# Patient Record
Sex: FEMALE | Race: WHITE | NOT HISPANIC OR LATINO | Employment: OTHER | URBAN - METROPOLITAN AREA
[De-identification: names, ages, dates, MRNs, and addresses within clinical notes are randomized per-mention and may not be internally consistent; named-entity substitution may affect disease eponyms.]

---

## 2023-10-23 ENCOUNTER — TELEPHONE (OUTPATIENT)
Dept: PRIMARY CARE | Facility: CLINIC | Age: 68
End: 2023-10-23
Payer: MEDICARE

## 2023-10-23 DIAGNOSIS — G89.29 CHRONIC KNEE PAIN, UNSPECIFIED LATERALITY: Primary | ICD-10-CM

## 2023-10-23 DIAGNOSIS — M25.569 CHRONIC KNEE PAIN, UNSPECIFIED LATERALITY: Primary | ICD-10-CM

## 2023-10-23 NOTE — TELEPHONE ENCOUNTER
Pt of Dr Ojeda's. She injured L knee months ago doing yard work and was nursing it herself at home. She is now feeling the pain and would like to get an appt to have a possible MRI to advise the injury. I do not show any possible appts with you. Please advise where you would like the pt fit in or if you would like her to advise another option. Thank you!

## 2023-10-25 ENCOUNTER — CLINICAL SUPPORT (OUTPATIENT)
Dept: ORTHOPEDIC SURGERY | Facility: CLINIC | Age: 68
End: 2023-10-25
Payer: MEDICARE

## 2023-10-25 ENCOUNTER — OFFICE VISIT (OUTPATIENT)
Dept: ORTHOPEDIC SURGERY | Facility: CLINIC | Age: 68
End: 2023-10-25
Payer: MEDICARE

## 2023-10-25 DIAGNOSIS — M23.307 MENISCUS DEGENERATION, LEFT: Primary | ICD-10-CM

## 2023-10-25 DIAGNOSIS — G89.29 CHRONIC KNEE PAIN, UNSPECIFIED LATERALITY: ICD-10-CM

## 2023-10-25 DIAGNOSIS — M17.12 ARTHRITIS OF KNEE, LEFT: ICD-10-CM

## 2023-10-25 DIAGNOSIS — M25.562 LEFT KNEE PAIN, UNSPECIFIED CHRONICITY: ICD-10-CM

## 2023-10-25 DIAGNOSIS — M25.569 CHRONIC KNEE PAIN, UNSPECIFIED LATERALITY: ICD-10-CM

## 2023-10-25 PROCEDURE — 99214 OFFICE O/P EST MOD 30 MIN: CPT | Performed by: FAMILY MEDICINE

## 2023-10-25 PROCEDURE — 1159F MED LIST DOCD IN RCRD: CPT | Performed by: FAMILY MEDICINE

## 2023-10-25 PROCEDURE — 1036F TOBACCO NON-USER: CPT | Performed by: FAMILY MEDICINE

## 2023-10-25 PROCEDURE — 1126F AMNT PAIN NOTED NONE PRSNT: CPT | Performed by: FAMILY MEDICINE

## 2023-10-25 RX ORDER — RIVAROXABAN 20 MG/1
1 TABLET, FILM COATED ORAL NIGHTLY
COMMUNITY
Start: 2022-09-16 | End: 2024-05-20

## 2023-10-25 RX ORDER — LEVOTHYROXINE SODIUM 88 UG/1
88 TABLET ORAL
COMMUNITY
Start: 2023-07-31

## 2023-10-25 RX ORDER — LIOTHYRONINE SODIUM 5 UG/1
5 TABLET ORAL DAILY
COMMUNITY
Start: 2016-06-16 | End: 2023-12-29 | Stop reason: ALTCHOICE

## 2023-10-25 RX ORDER — LISINOPRIL 10 MG/1
0.5 TABLET ORAL NIGHTLY
COMMUNITY
Start: 2021-10-14 | End: 2023-12-29 | Stop reason: ALTCHOICE

## 2023-10-25 RX ORDER — LEVOTHYROXINE SODIUM 88 UG/1
1 TABLET ORAL
COMMUNITY
End: 2023-11-15

## 2023-10-25 ASSESSMENT — PAIN SCALES - GENERAL: PAINLEVEL_OUTOF10: 6

## 2023-10-25 ASSESSMENT — PAIN DESCRIPTION - DESCRIPTORS: DESCRIPTORS: SHARP;SHOOTING;OTHER (COMMENT)

## 2023-10-25 ASSESSMENT — PAIN - FUNCTIONAL ASSESSMENT: PAIN_FUNCTIONAL_ASSESSMENT: 0-10

## 2023-10-25 NOTE — PROGRESS NOTES
Chief Complaint: Pain of the Left Knee  History of Present Illness:  Encounter date: 10/25/2023  Sherice Dickerson is a 68 y.o. female who presents today for evaluation of left knee pain.  She has had bilateral knee pain for years but it would resolved after short periods of rest.  She had a incident last fall where she was rototilling and had some twisting her knee.  Since then she has had continuous pain in her left knee with progressively worsening pain over the summer.  She localizes the pain to the medial joint line.  Pain is worse with walking, biking, and exercising.  She has tried acupuncture, massage, heat, ice, and Tylenol for pain relief with no significant improvement.  She denies numbness, tingling, weakness, locking.    Problem List  There are no problems to display for this patient.      Past Medical History  Past Medical History:   Diagnosis Date    Elevated blood-pressure reading, without diagnosis of hypertension 07/06/2020    Elevated blood pressure reading    Nontoxic single thyroid nodule 12/27/2018    Right thyroid nodule       Past Surgical History  Past Surgical History:   Procedure Laterality Date    COLONOSCOPY  09/21/2016    Complete Colonoscopy    TONSILLECTOMY  06/05/2014    Tonsillectomy    TOTAL THYROIDECTOMY  06/12/2018    Thyroid Surgery Total Thyroidectomy       Social History  Social History     Socioeconomic History    Marital status:      Spouse name: Not on file    Number of children: Not on file    Years of education: Not on file    Highest education level: Not on file   Occupational History    Not on file   Tobacco Use    Smoking status: Never    Smokeless tobacco: Never   Substance and Sexual Activity    Alcohol use: Not on file    Drug use: Not on file    Sexual activity: Not on file   Other Topics Concern    Not on file   Social History Narrative    Not on file     Social Determinants of Health     Financial Resource Strain: Not on file   Food Insecurity: Not on file    Transportation Needs: Not on file   Physical Activity: Not on file   Stress: Not on file   Social Connections: Not on file   Intimate Partner Violence: Not on file   Housing Stability: Not on file       Allergies  No Known Allergies    Medications  Current Outpatient Medications   Medication Sig Dispense Refill    levothyroxine (Synthroid, Levoxyl) 88 mcg tablet Take 1 tablet (88 mcg) by mouth once daily in the morning. Take before meals.      liothyronine (Cytomel) 5 mcg tablet Take 1 tablet (5 mcg) by mouth once daily.      lisinopril 10 mg tablet Take 0.5 tablets (5 mg) by mouth once daily at bedtime.      Xarelto 20 mg tablet Take 1 tablet (20 mg) by mouth once daily at bedtime.      atorvastatin 20 mg/5 mL (4 mg/mL) suspension Take 1 capsule by mouth 3 times a day with meals.      levothyroxine (Synthroid) 88 mcg tablet Take 1 mcg/kg by mouth once daily in the morning. Take before meals.       No current facility-administered medications for this visit.       Physical Exam:  General exam-  GENERAL:  Awake, alert, and oriented, no apparent distress, pleasant, and cooperative  PSYC: Mood is euthymic, affect is congruent  EAR, NOSE, THROAT:  Normocephalic, atraumatic, moist membranes, anicteric sclera  LUNG: Nonlabored breathing  HEART: No clubbing or cyanosis  SKIN: No increased erythema, warmth, rashes, or concerning skin lesions  NEURO: Sensation is intact in the bilateral upper and lower extremities. Strength is grossly 5 out of 5 throughout the bilateral upper and lower extremities, unless noted below.  GAIT: Non-antalgic  MUSCULOSKELETAL:  Examination of the bilateral knee: Range of motion is full but painful at end range flexion bilaterally. No effusion. No patellar apprehension.  There is tenderness palpation over the medial joint line bilaterally left greater than right.  There is no tenderness over the lateral joint line, patellar facets. Varus and valgus stress test are negative. Lachman's negative.  Posterior drawer is negative. Mary's and meniscal grind tests are positive.    Imaging:  Left knee x-ray shows moderate arthritis of the medial compartment and mild arthritis of patellofemoral joint.    The studies were reviewed with Dr. Lozoya personally in the office today.    Diagnoses and all orders for this visit:  Left knee pain, unspecified chronicity  -     XR knee left 3 views; Future  -     Point of Care Ultrasound  Chronic knee pain, unspecified laterality  -     Referral to Orthopaedic Surgery    Patient Discussion/Summary  We reviewed the exam and x-ray findings and discussed the conservative and surgical treatment options. We agreed to start with conservative treatment including formal physical therapy and diclofenac gel. Briefly discussed further treatment if she failed conservative treatment including injections to replacement. Patient instructed to follow-up if symptoms do not improve after physical therapy or significantly worsened.      Navi Tang DO  Primary Care Sports Medicine Fellow    A report of my findings and recommendations will be sent to the primary and referring physician via written or electronic means when information is available.

## 2023-11-09 ENCOUNTER — EVALUATION (OUTPATIENT)
Dept: PHYSICAL THERAPY | Facility: CLINIC | Age: 68
End: 2023-11-09
Payer: MEDICARE

## 2023-11-09 DIAGNOSIS — M23.307 MENISCUS DEGENERATION, LEFT: ICD-10-CM

## 2023-11-09 DIAGNOSIS — M25.562 CHRONIC PAIN OF BOTH KNEES: Primary | ICD-10-CM

## 2023-11-09 DIAGNOSIS — M17.12 ARTHRITIS OF KNEE, LEFT: ICD-10-CM

## 2023-11-09 DIAGNOSIS — G89.29 CHRONIC PAIN OF BOTH KNEES: Primary | ICD-10-CM

## 2023-11-09 DIAGNOSIS — M25.561 CHRONIC PAIN OF BOTH KNEES: Primary | ICD-10-CM

## 2023-11-09 PROCEDURE — 97535 SELF CARE MNGMENT TRAINING: CPT | Mod: GP

## 2023-11-09 PROCEDURE — 97110 THERAPEUTIC EXERCISES: CPT | Mod: GP

## 2023-11-09 PROCEDURE — 97161 PT EVAL LOW COMPLEX 20 MIN: CPT | Mod: GP

## 2023-11-09 ASSESSMENT — PATIENT HEALTH QUESTIONNAIRE - PHQ9
SUM OF ALL RESPONSES TO PHQ9 QUESTIONS 1 AND 2: 0
1. LITTLE INTEREST OR PLEASURE IN DOING THINGS: NOT AT ALL
2. FEELING DOWN, DEPRESSED OR HOPELESS: NOT AT ALL

## 2023-11-09 ASSESSMENT — ENCOUNTER SYMPTOMS
LOSS OF SENSATION IN FEET: 0
OCCASIONAL FEELINGS OF UNSTEADINESS: 0
DEPRESSION: 0

## 2023-11-09 NOTE — PROGRESS NOTES
Physical Therapy    Physical Therapy Evaluation and Treatment      Patient Name: Sherice Dickerson  MRN: [id number]  Today's Date: 11/9/2023  Time Calculation  Start Time: 1606  Stop Time: 1646  Time Calculation (min): 40 min      Insurance  Visit number:  1  MC 90 day 11/9/23 - 2/7/23      Assessment:  Pt. presented with B knee pain which was likely d/t OA + medial meniscal degeneration resulting from hip weakness, altered body/joint mechanics, & abnormal neuromotor control.  Pt. is likely to benefit from skilled interventions to address their impairments, & treatment to emphasize dry needling to decrease pain & improve function, manual techniques to decrease pain & improve joint/soft-tissue mobility, balance & gait training exercises to improve safety & functional capacity, & core & LE exercises to increase strength, endurance, & neuromotor control.    Standardized testing and measures administered today reveal that the patient has multiple impairments in body structures and functions, activity limitations, and participation restrictions.  The patient does not have any personal factors and comorbidities that may serve as barriers affecting the plan of care.   Skilled PT services are warranted in order to realize measurable change in the above outcome measures and achieve improvements in the patient's functional status and individual goals.      Plan:  Treatment/Interventions: Blood flow restriction therapy, Dry needling, Education/ Instruction, Electrical stimulation, Gait training, Manual therapy, Neuromuscular re-education, Self care/ home management, Therapeutic activities, Therapeutic exercises  PT Plan: Skilled PT  PT Frequency: 1 time per week  Duration: 8 weeks  Onset Date: 11/09/22  Certification Period Start Date: 11/09/23  Certification Period End Date: 02/07/24  Rehab Potential: Good  Plan of Care Agreement: Patient    Current Problem:   1. Chronic pain of both knees  Follow Up In Physical Therapy      2.  Arthritis of knee, left  Referral to Physical Therapy    Follow Up In Physical Therapy      3. Meniscus degeneration, left  Referral to Physical Therapy    Follow Up In Physical Therapy          Subjective    Sherice Dickerson is a 68 y.o. female who presents with c/o B knee pain.  Patient states symptoms began insidiously ~1 year ago.  Pt states the pain got progressively worse over the next few months.  She attempted to go to the rec center to strengthen her knees but that just made it worse.  She attempted acupuncture which helped a little, but didn't provide relief.  She has also attempted massage which helps short term.  Pt denies any locking/catching, but the knees do 'click' a bit.    Pain Intensity:  0/10 currently at rest, (10 = worst imaginable pain), 7-8/10 at worst.    Description:  achy at times, but can be sharp  Duration:  constant   Status:  progressively worsening     Reports symptoms are aggravated with walking, prolonged standing, turning/pivoting, stairs, and alleviated with rest, avoiding aggravating activities .     Objective   Observations:  pt was pleasant, cooperative, & A+O x3.  Visual inspection was unremarkable.  Distal PMS was intact & S/S of infection noted.    Palpatory Exam:  pt. c/o their 'familiar' pain B medial knee joint line.  Hypertonicity & tenderness noted in the B hip flexors, quadriceps, TFL, gluteals, hip ERs, hamstring, & gastroc/soleus mm.    Hip ROM_______ ___R [deg]___ ___L [deg]___    Flex____________ ___105_____ ___105_____   ER in 90/90____ ___55_____ ___55_____   IR in 90/90_____ ___15_____ ___40_____   ER in Prone____ ___60_____ ___60_____   IR in Prone_____ ___35_____ ___45_____   Ext_____________ ___-5_____ ___0_____   * = painful       Knee ROM_____ ___R [deg]___ ___L [deg]___    Flex____________ ___135_____ ___125*_____   Ext_____________ ___0_____ ___0_____   * = painful    Hip  MMT______ ___R______ ___L_____   Flex____________ ___5/5____ ___5/5___   ADD___________ ___5/5____ ___5/5___   ABD___________  ___3/5____ ___3-/5___   Ext_____________ ___3/5____ ___3-/5___   * = painful          Knee MMT_____ ___R______ ___L_____   Flex____________ ___5/5____ ___5/5___   Ext_____________ ___5/5*____ ___5/5*___   * = painful         Hip Tests_________ ___R_____ ___L_____    Gorge's____________ ___[+]_____ ___[+]_____   Thomas__________ ___[+]_____ ___[+]_____     Knee Tests_________ ___R_____ ___L_____    Valgus at 0 deg____ ___[-]_____ ___[-]_____   Valgus at 30 deg___ ___[-]_____ ___[-]_____   Varus at 0 deg_____ ___[-]_____ ___[-]_____   Varus at 30 deg____ ___[-]_____ ___[-]_____   Jt. Line Tenderness_ ___[+]_____ ___[+]_____   Mary's________ ___[+]_____ ___[+]_____   Apley's___________ ___[-]_____ ___[+]_____         Treatment today included:    PT Evaluation (65960):  16 minutes      Therapeutic Exercises (05269):  18 minutes    Access Code: [email or web address]  URL: [email or web address]  Date: 11/09/2023  Prepared by: Joshua Moeller    Exercises  - Supine Bridge  - 5-7 x weekly - 2-3 sets - 10-15 reps  - Sidelying Hip Abduction  - 5-7 x weekly - 2-3 sets - 10-15 reps  - Squat with Chair Touch  - 5-7 x weekly - 2-3 sets - 10-15 reps    * = added to HEP.  Sheet with exercise descriptions and images issued.  Skilled intervention utilized in the appropriate selection & application of above exercises.  Verbal and tactile cues provided for proper form and technique.  Pt. demonstrated appropriate form & verbalized understanding of optimal technique for above exercises.      TA = transverse abdominis activation  PPT = posterior pelvic tilt      Self Care / Home Management (90672):  12 minutes  The patient was educated on:  the importance of positioning, proper posture, and body mechanics, joint mechanics and pathology, general tissue healing time, the appropriate use of heat and cold to  control pain and inflammation, the importance of general therapeutic exercise, especially to stay within pain-free ROM, specific anatomy, function, & regional interdependence of involved areas, & likely cause of impairments & POC.  Pt's questions were answered to their satisfaction, & pt. verbalized understanding & agreement with POC.    Outcome Measures:  Other Measures  Lower Extremity Funtional Score (LEFS): 58/80 or 72.5%       Goals:  Demonstrate independence with home exercise program  Increase B knee ROM to pain free + WNL  Increase B knee strength to pain free + WNL  Report decrease in pain by greater than or equal to 2 points to meet the MCID  Increase score of LEFS by greater than or equal to 9 points to meet the MCID  Perform ADLs without an increase symptoms  Ascend / descend stairs without an increase in symptoms  Ambulate x 2 miles without an increase in symptoms  Pt. will be able to sleep through the night without an increase in symptoms

## 2023-11-09 NOTE — Clinical Note
November 9, 2023     Patient: Sherice Dickerson   YOB: 1955   Date of Visit: 11/9/2023       To Whom It May Concern:    It is my medical opinion that Sherice Dickerson {Work release (duty restriction):33830}.    If you have any questions or concerns, please don't hesitate to call.         Sincerely,        Joshua Moeller, PT    CC: No Recipients

## 2023-11-09 NOTE — LETTER
November 9, 2023    [place]  [address] [address]  [address]  [facility] [address]    Patient: Sherice Dickerson   YOB: 1955   Date of Visit: 11/9/2023       Dear Navdeep Lozoya Md  [address]  [place] [address]    The attached plan of care is being sent to you because your patient’s medical reimbursement requires that you certify the plan of care. Your signature is required to allow uninterrupted insurance coverage.      You may indicate your approval by signing below and faxing this form back to us at Dept Fax: [phone].    Please call Dept: [phone] with any questions or concerns.    Thank you for this referral,        Joshua Moeller PT  [facility] [address] [facility]  [address] [facility] [address]    Payer: Payor: MEDICARE / Plan: MEDICARE PART A AND B / Product Type: *No Product type* /                                                                         Date:     Dear Joshua Moeller PT,     Re: Ms. Sherice Dickerson, MRN:[id number]    I certify that I have reviewed the attached plan of care and it is medically necessary for Ms. Sherice Dickerson (1955) who is under my care.          ______________________________________                    _________________  Provider name and credentials                                           Date and time                                                                                           Plan of Care 11/9/23   Effective from: 11/9/2023  Effective to: 2/7/2024    Plan ID: [id number]            Participants as of Finalize on 11/9/2023    Name Type Comments Contact Info    Navdeep Lozoya MD Referring Provider  [phone]    Joshua Moeller PT Physical Therapist  [phone]       Last Plan Note     Author: Joshua Moeller PT Status: Incomplete Last edited: 11/9/2023  4:00 PM       Physical Therapy    Physical Therapy Evaluation and Treatment      Patient Name: Sherice Dickerson  MRN:  [id number]  Today's Date: 11/9/2023  Time Calculation  Start Time: 1606  Stop Time: 1646  Time Calculation (min): 40 min      Insurance  Visit number:  1  MC 90 day 11/9/23 - 2/7/23      Assessment:  Pt. presented with B knee pain which was likely d/t OA + medial meniscal degeneration resulting from hip weakness, altered body/joint mechanics, & abnormal neuromotor control.  Pt. is likely to benefit from skilled interventions to address their impairments, & treatment to emphasize dry needling to decrease pain & improve function, manual techniques to decrease pain & improve joint/soft-tissue mobility, balance & gait training exercises to improve safety & functional capacity, & core & LE exercises to increase strength, endurance, & neuromotor control.    Standardized testing and measures administered today reveal that the patient has multiple impairments in body structures and functions, activity limitations, and participation restrictions.  The patient does not have any personal factors and comorbidities that may serve as barriers affecting the plan of care.   Skilled PT services are warranted in order to realize measurable change in the above outcome measures and achieve improvements in the patient's functional status and individual goals.      Plan:  Treatment/Interventions: Blood flow restriction therapy, Dry needling, Education/ Instruction, Electrical stimulation, Gait training, Manual therapy, Neuromuscular re-education, Self care/ home management, Therapeutic activities, Therapeutic exercises  PT Plan: Skilled PT  PT Frequency: 1 time per week  Duration: 8 weeks  Onset Date: 11/09/22  Certification Period Start Date: 11/09/23  Certification Period End Date: 02/07/24  Rehab Potential: Good  Plan of Care Agreement: Patient    Current Problem:   1. Chronic pain of both knees  Follow Up In Physical Therapy      2. Arthritis of knee, left  Referral to Physical Therapy    Follow Up In Physical Therapy      3. Meniscus  degeneration, left  Referral to Physical Therapy    Follow Up In Physical Therapy          Subjective   Sherice Dickerson is a 68 y.o. female who presents with c/o B knee pain.  Patient states symptoms began insidiously ~1 year ago.  Pt states the pain got progressively worse over the next few months.  She attempted to go to the rec center to strengthen her knees but that just made it worse.  She attempted acupuncture which helped a little, but didn't provide relief.  She has also attempted massage which helps short term.  Pt denies any locking/catching, but the knees do 'click' a bit.    Pain Intensity:  0/10 currently at rest, (10 = worst imaginable pain), 7-8/10 at worst.    Description:  achy at times, but can be sharp  Duration:  constant   Status:  progressively worsening     Reports symptoms are aggravated with walking, prolonged standing, turning/pivoting, stairs, and alleviated with rest, avoiding aggravating activities .     Objective  Observations:  pt was pleasant, cooperative, & A+O x3.  Visual inspection was unremarkable.  Distal PMS was intact & S/S of infection noted.    Palpatory Exam:  pt. c/o their 'familiar' pain B medial knee joint line.  Hypertonicity & tenderness noted in the B hip flexors, quadriceps, TFL, gluteals, hip ERs, hamstring, & gastroc/soleus mm.    Hip ROM_______ ___R [deg]___ ___L [deg]___    Flex____________ ___105_____ ___105_____   ER in 90/90____ ___55_____ ___55_____   IR in 90/90_____ ___15_____ ___40_____   ER in Prone____ ___60_____ ___60_____   IR in Prone_____ ___35_____ ___45_____   Ext_____________ ___-5_____ ___0_____   * = painful       Knee ROM_____ ___R [deg]___ ___L [deg]___    Flex____________ ___135_____ ___125*_____   Ext_____________ ___0_____ ___0_____   * = painful    Hip  MMT______ ___R______ ___L_____   Flex____________ ___5/5____ ___5/5___   ADD___________ ___5/5____ ___5/5___   ABD___________  ___3/5____ ___3-/5___   Ext_____________ ___3/5____ ___3-/5___   * = painful          Knee MMT_____ ___R______ ___L_____   Flex____________ ___5/5____ ___5/5___   Ext_____________ ___5/5*____ ___5/5*___   * = painful         Hip Tests_________ ___R_____ ___L_____    Gorge's____________ ___[+]_____ ___[+]_____   Thomas__________ ___[+]_____ ___[+]_____     Knee Tests_________ ___R_____ ___L_____    Valgus at 0 deg____ ___[-]_____ ___[-]_____   Valgus at 30 deg___ ___[-]_____ ___[-]_____   Varus at 0 deg_____ ___[-]_____ ___[-]_____   Varus at 30 deg____ ___[-]_____ ___[-]_____   Jt. Line Tenderness_ ___[+]_____ ___[+]_____   Mary's________ ___[+]_____ ___[+]_____   Apley's___________ ___[-]_____ ___[+]_____         Treatment today included:    PT Evaluation (89979):  16 minutes      Therapeutic Exercises (11564):  18 minutes    Access Code: [email or web address]  URL: [email or web address]  Date: 11/09/2023  Prepared by: Joshua Moeller    Exercises  - Supine Bridge  - 5-7 x weekly - 2-3 sets - 10-15 reps  - Sidelying Hip Abduction  - 5-7 x weekly - 2-3 sets - 10-15 reps  - Squat with Chair Touch  - 5-7 x weekly - 2-3 sets - 10-15 reps    * = added to HEP.  Sheet with exercise descriptions and images issued.  Skilled intervention utilized in the appropriate selection & application of above exercises.  Verbal and tactile cues provided for proper form and technique.  Pt. demonstrated appropriate form & verbalized understanding of optimal technique for above exercises.      TA = transverse abdominis activation  PPT = posterior pelvic tilt      Self Care / Home Management (71337):  12 minutes  The patient was educated on:  the importance of positioning, proper posture, and body mechanics, joint mechanics and pathology, general tissue healing time, the appropriate use of heat and cold to  control pain and inflammation, the importance of general therapeutic exercise, especially to stay within pain-free ROM, specific anatomy, function, & regional interdependence of involved areas, & likely cause of impairments & POC.  Pt's questions were answered to their satisfaction, & pt. verbalized understanding & agreement with POC.    Outcome Measures:  Other Measures  Lower Extremity Funtional Score (LEFS): 58/80 or 72.5%       Goals:  Demonstrate independence with home exercise program  Increase B knee ROM to pain free + WNL  Increase B knee strength to pain free + WNL  Report decrease in pain by greater than or equal to 2 points to meet the MCID  Increase score of LEFS by greater than or equal to 9 points to meet the MCID  Perform ADLs without an increase symptoms  Ascend / descend stairs without an increase in symptoms  Ambulate x 2 miles without an increase in symptoms  Pt. will be able to sleep through the night without an increase in symptoms           Current Participants as of 11/9/2023    Name Type Comments Contact Info    Navdeep Lozoya MD Referring Provider  [phone]    Signature pending    Joshua Moeller PT Physical Therapist  [phone]    Signature pending

## 2023-11-09 NOTE — Clinical Note
November 9, 2023     Patient: Sherice Dickerson   YOB: 1955   Date of Visit: 11/9/2023       To Whom it May Concern:    Sherice Dickerson was seen in my clinic on 11/9/2023. She {Return to school/sport:89754}.    If you have any questions or concerns, please don't hesitate to call.         Sincerely,          Joshua Moeller, PT        CC: No Recipients

## 2023-11-15 ENCOUNTER — OFFICE VISIT (OUTPATIENT)
Dept: PRIMARY CARE | Facility: CLINIC | Age: 68
End: 2023-11-15
Payer: MEDICARE

## 2023-11-15 VITALS
WEIGHT: 168 LBS | OXYGEN SATURATION: 98 % | DIASTOLIC BLOOD PRESSURE: 82 MMHG | SYSTOLIC BLOOD PRESSURE: 132 MMHG | HEART RATE: 64 BPM | HEIGHT: 68 IN | BODY MASS INDEX: 25.46 KG/M2 | TEMPERATURE: 98.1 F

## 2023-11-15 DIAGNOSIS — I48.91 ATRIAL FIBRILLATION, NEW ONSET (MULTI): ICD-10-CM

## 2023-11-15 DIAGNOSIS — I10 BENIGN ESSENTIAL HYPERTENSION: ICD-10-CM

## 2023-11-15 DIAGNOSIS — Z12.31 VISIT FOR SCREENING MAMMOGRAM: ICD-10-CM

## 2023-11-15 DIAGNOSIS — C73 MALIGNANT NEOPLASM OF THYROID GLAND (MULTI): ICD-10-CM

## 2023-11-15 DIAGNOSIS — E78.00 HYPERCHOLESTEROLEMIA: ICD-10-CM

## 2023-11-15 DIAGNOSIS — D64.9 ANEMIA, UNSPECIFIED TYPE: ICD-10-CM

## 2023-11-15 DIAGNOSIS — Z12.11 COLON CANCER SCREENING: ICD-10-CM

## 2023-11-15 DIAGNOSIS — E55.9 VITAMIN D DEFICIENCY: ICD-10-CM

## 2023-11-15 DIAGNOSIS — E89.0 POSTOPERATIVE HYPOTHYROIDISM: ICD-10-CM

## 2023-11-15 DIAGNOSIS — G89.29 CHRONIC PAIN OF BOTH KNEES: ICD-10-CM

## 2023-11-15 DIAGNOSIS — K86.89 PANCREATIC INSUFFICIENCY (HHS-HCC): ICD-10-CM

## 2023-11-15 DIAGNOSIS — M25.561 CHRONIC PAIN OF BOTH KNEES: ICD-10-CM

## 2023-11-15 DIAGNOSIS — Z00.00 ROUTINE GENERAL MEDICAL EXAMINATION AT HEALTH CARE FACILITY: ICD-10-CM

## 2023-11-15 DIAGNOSIS — Z00.00 MEDICARE ANNUAL WELLNESS VISIT, SUBSEQUENT: Primary | ICD-10-CM

## 2023-11-15 DIAGNOSIS — I77.9 CAROTID ARTERY DISEASE, UNSPECIFIED LATERALITY, UNSPECIFIED TYPE (CMS-HCC): ICD-10-CM

## 2023-11-15 DIAGNOSIS — M25.562 CHRONIC PAIN OF BOTH KNEES: ICD-10-CM

## 2023-11-15 DIAGNOSIS — M35.00 SJOGREN'S SYNDROME, WITH UNSPECIFIED ORGAN INVOLVEMENT (MULTI): ICD-10-CM

## 2023-11-15 PROBLEM — R15.0 INCOMPLETE DEFECATION: Status: ACTIVE | Noted: 2023-11-15

## 2023-11-15 PROBLEM — M72.2 PLANTAR FASCIITIS, RIGHT: Status: ACTIVE | Noted: 2023-11-15

## 2023-11-15 PROBLEM — M65.4 DE QUERVAIN'S TENOSYNOVITIS, LEFT: Status: ACTIVE | Noted: 2023-11-15
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
## 2023-11-15 PROBLEM — R00.2 PALPITATIONS: Status: ACTIVE | Noted: 2023-11-15

## 2023-11-15 PROBLEM — R76.8 SS-A ANTIBODY POSITIVE: Status: ACTIVE | Noted: 2023-11-15

## 2023-11-15 PROBLEM — M25.532 LEFT WRIST PAIN: Status: ACTIVE | Noted: 2023-11-15

## 2023-11-15 PROBLEM — E06.3 HASHIMOTO'S THYROIDITIS: Status: ACTIVE | Noted: 2018-07-18

## 2023-11-15 PROBLEM — R42 DIZZINESS: Status: ACTIVE | Noted: 2023-11-15

## 2023-11-15 PROBLEM — D36.9 TUBULAR ADENOMA: Status: ACTIVE | Noted: 2023-11-15

## 2023-11-15 PROBLEM — E03.9 HYPOTHYROIDISM: Status: ACTIVE | Noted: 2023-11-15

## 2023-11-15 PROBLEM — R76.8 ANA POSITIVE: Status: ACTIVE | Noted: 2023-11-15

## 2023-11-15 PROBLEM — I34.0 MITRAL REGURGITATION: Status: ACTIVE | Noted: 2023-11-15

## 2023-11-15 PROBLEM — N95.8 GENITOURINARY SYNDROME OF MENOPAUSE: Status: ACTIVE | Noted: 2023-11-15

## 2023-11-15 PROBLEM — R63.5 WEIGHT GAIN: Status: ACTIVE | Noted: 2023-11-15

## 2023-11-15 PROBLEM — R10.11 RIGHT UPPER QUADRANT ABDOMINAL PAIN: Status: ACTIVE | Noted: 2023-11-15

## 2023-11-15 PROBLEM — M19.90 ARTHRITIS: Status: ACTIVE | Noted: 2023-11-15

## 2023-11-15 PROBLEM — K58.9 IRRITABLE BOWEL SYNDROME: Status: ACTIVE | Noted: 2022-01-04

## 2023-11-15 PROBLEM — Z77.120 SUSPECTED EXPOSURE TO MOLD: Status: ACTIVE | Noted: 2022-05-03

## 2023-11-15 PROBLEM — J01.90 ACUTE SINUSITIS: Status: ACTIVE | Noted: 2023-11-15

## 2023-11-15 PROBLEM — R01.1 HEART MURMUR: Status: ACTIVE | Noted: 2023-11-15

## 2023-11-15 PROBLEM — I51.7 ENLARGED HEART: Status: ACTIVE | Noted: 2023-11-15

## 2023-11-15 PROBLEM — M79.89 SOFT TISSUE MASS: Status: ACTIVE | Noted: 2023-11-15

## 2023-11-15 PROBLEM — M70.40 PREPATELLAR BURSITIS: Status: ACTIVE | Noted: 2023-11-15

## 2023-11-15 PROBLEM — F41.1 GENERALIZED ANXIETY DISORDER: Status: ACTIVE | Noted: 2023-11-15

## 2023-11-15 PROCEDURE — 3079F DIAST BP 80-89 MM HG: CPT | Performed by: INTERNAL MEDICINE

## 2023-11-15 PROCEDURE — 1126F AMNT PAIN NOTED NONE PRSNT: CPT | Performed by: INTERNAL MEDICINE

## 2023-11-15 PROCEDURE — G0439 PPPS, SUBSEQ VISIT: HCPCS | Performed by: INTERNAL MEDICINE

## 2023-11-15 PROCEDURE — 1036F TOBACCO NON-USER: CPT | Performed by: INTERNAL MEDICINE

## 2023-11-15 PROCEDURE — 1159F MED LIST DOCD IN RCRD: CPT | Performed by: INTERNAL MEDICINE

## 2023-11-15 PROCEDURE — 1160F RVW MEDS BY RX/DR IN RCRD: CPT | Performed by: INTERNAL MEDICINE

## 2023-11-15 PROCEDURE — 99214 OFFICE O/P EST MOD 30 MIN: CPT | Performed by: INTERNAL MEDICINE

## 2023-11-15 PROCEDURE — 3075F SYST BP GE 130 - 139MM HG: CPT | Performed by: INTERNAL MEDICINE

## 2023-11-15 PROCEDURE — G0444 DEPRESSION SCREEN ANNUAL: HCPCS | Performed by: INTERNAL MEDICINE

## 2023-11-15 PROCEDURE — 1170F FXNL STATUS ASSESSED: CPT | Performed by: INTERNAL MEDICINE

## 2023-11-15 PROCEDURE — G0442 ANNUAL ALCOHOL SCREEN 15 MIN: HCPCS | Performed by: INTERNAL MEDICINE

## 2023-11-15 RX ORDER — CHOLECALCIFEROL (VITAMIN D3) 25 MCG
1000 TABLET ORAL DAILY
COMMUNITY
Start: 2022-11-16

## 2023-11-15 RX ORDER — ESTRADIOL 0.1 MG/G
4 CREAM VAGINAL 3 TIMES WEEKLY
COMMUNITY
Start: 2021-12-02

## 2023-11-15 ASSESSMENT — PATIENT HEALTH QUESTIONNAIRE - PHQ9
1. LITTLE INTEREST OR PLEASURE IN DOING THINGS: NOT AT ALL
SUM OF ALL RESPONSES TO PHQ9 QUESTIONS 1 AND 2: 0
2. FEELING DOWN, DEPRESSED OR HOPELESS: NOT AT ALL

## 2023-11-15 ASSESSMENT — ACTIVITIES OF DAILY LIVING (ADL)
DRESSING: INDEPENDENT
MANAGING_FINANCES: INDEPENDENT
DRESSING: INDEPENDENT
TAKING_MEDICATION: INDEPENDENT
BATHING: INDEPENDENT
BATHING: INDEPENDENT
DOING_HOUSEWORK: INDEPENDENT
GROCERY_SHOPPING: INDEPENDENT

## 2023-11-15 ASSESSMENT — ENCOUNTER SYMPTOMS
HEMATOLOGIC/LYMPHATIC NEGATIVE: 1
RESPIRATORY NEGATIVE: 1
PSYCHIATRIC NEGATIVE: 1
CONSTITUTIONAL NEGATIVE: 1
NEUROLOGICAL NEGATIVE: 1
GASTROINTESTINAL NEGATIVE: 1
EYES NEGATIVE: 1

## 2023-11-17 ENCOUNTER — LAB (OUTPATIENT)
Dept: LAB | Facility: LAB | Age: 68
End: 2023-11-17
Payer: MEDICARE

## 2023-11-17 DIAGNOSIS — D64.9 ANEMIA, UNSPECIFIED TYPE: ICD-10-CM

## 2023-11-17 DIAGNOSIS — E55.9 VITAMIN D DEFICIENCY: ICD-10-CM

## 2023-11-17 DIAGNOSIS — E78.00 HYPERCHOLESTEROLEMIA: ICD-10-CM

## 2023-11-17 LAB
25(OH)D3 SERPL-MCNC: 47 NG/ML (ref 30–100)
ALBUMIN SERPL BCP-MCNC: 4.3 G/DL (ref 3.4–5)
ALP SERPL-CCNC: 52 U/L (ref 33–136)
ALT SERPL W P-5'-P-CCNC: 19 U/L (ref 7–45)
ANION GAP SERPL CALC-SCNC: 11 MMOL/L (ref 10–20)
AST SERPL W P-5'-P-CCNC: 19 U/L (ref 9–39)
BASOPHILS # BLD AUTO: 0.03 X10*3/UL (ref 0–0.1)
BASOPHILS NFR BLD AUTO: 0.7 %
BILIRUB SERPL-MCNC: 0.8 MG/DL (ref 0–1.2)
BUN SERPL-MCNC: 13 MG/DL (ref 6–23)
CALCIUM SERPL-MCNC: 9.3 MG/DL (ref 8.6–10.3)
CHLORIDE SERPL-SCNC: 101 MMOL/L (ref 98–107)
CHOLEST SERPL-MCNC: 218 MG/DL (ref 0–199)
CHOLESTEROL/HDL RATIO: 2.7
CO2 SERPL-SCNC: 30 MMOL/L (ref 21–32)
CREAT SERPL-MCNC: 0.92 MG/DL (ref 0.5–1.05)
EOSINOPHIL # BLD AUTO: 0.05 X10*3/UL (ref 0–0.7)
EOSINOPHIL NFR BLD AUTO: 1.2 %
ERYTHROCYTE [DISTWIDTH] IN BLOOD BY AUTOMATED COUNT: 12.6 % (ref 11.5–14.5)
GFR SERPL CREATININE-BSD FRML MDRD: 68 ML/MIN/1.73M*2
GLUCOSE SERPL-MCNC: 89 MG/DL (ref 74–99)
HCT VFR BLD AUTO: 39.8 % (ref 36–46)
HDLC SERPL-MCNC: 81.1 MG/DL
HGB BLD-MCNC: 13.2 G/DL (ref 12–16)
IMM GRANULOCYTES # BLD AUTO: 0 X10*3/UL (ref 0–0.7)
IMM GRANULOCYTES NFR BLD AUTO: 0 % (ref 0–0.9)
LDLC SERPL CALC-MCNC: 121 MG/DL
LYMPHOCYTES # BLD AUTO: 1.72 X10*3/UL (ref 1.2–4.8)
LYMPHOCYTES NFR BLD AUTO: 41.2 %
MCH RBC QN AUTO: 31.1 PG (ref 26–34)
MCHC RBC AUTO-ENTMCNC: 33.2 G/DL (ref 32–36)
MCV RBC AUTO: 94 FL (ref 80–100)
MONOCYTES # BLD AUTO: 0.36 X10*3/UL (ref 0.1–1)
MONOCYTES NFR BLD AUTO: 8.6 %
NEUTROPHILS # BLD AUTO: 2.01 X10*3/UL (ref 1.2–7.7)
NEUTROPHILS NFR BLD AUTO: 48.3 %
NON HDL CHOLESTEROL: 137 MG/DL (ref 0–149)
NRBC BLD-RTO: 0 /100 WBCS (ref 0–0)
PLATELET # BLD AUTO: 255 X10*3/UL (ref 150–450)
POTASSIUM SERPL-SCNC: 3.7 MMOL/L (ref 3.5–5.3)
PROT SERPL-MCNC: 7.6 G/DL (ref 6.4–8.2)
RBC # BLD AUTO: 4.25 X10*6/UL (ref 4–5.2)
SODIUM SERPL-SCNC: 138 MMOL/L (ref 136–145)
TRIGL SERPL-MCNC: 82 MG/DL (ref 0–149)
VLDL: 16 MG/DL (ref 0–40)
WBC # BLD AUTO: 4.2 X10*3/UL (ref 4.4–11.3)

## 2023-11-17 PROCEDURE — 80061 LIPID PANEL: CPT

## 2023-11-17 PROCEDURE — 80053 COMPREHEN METABOLIC PANEL: CPT

## 2023-11-17 PROCEDURE — 82306 VITAMIN D 25 HYDROXY: CPT

## 2023-11-17 PROCEDURE — 36415 COLL VENOUS BLD VENIPUNCTURE: CPT

## 2023-11-17 PROCEDURE — 85025 COMPLETE CBC W/AUTO DIFF WBC: CPT

## 2023-11-20 NOTE — RESULT ENCOUNTER NOTE
Labs show elevated cholesterol and mildly low white blood cell count rest of lab results within normal range.  Please follow low-fat diet and regular exercise.

## 2023-11-30 ENCOUNTER — TREATMENT (OUTPATIENT)
Dept: PHYSICAL THERAPY | Facility: CLINIC | Age: 68
End: 2023-11-30
Payer: MEDICARE

## 2023-11-30 DIAGNOSIS — M25.561 CHRONIC PAIN OF BOTH KNEES: ICD-10-CM

## 2023-11-30 DIAGNOSIS — M23.307 MENISCUS DEGENERATION, LEFT: ICD-10-CM

## 2023-11-30 DIAGNOSIS — M25.562 CHRONIC PAIN OF BOTH KNEES: ICD-10-CM

## 2023-11-30 DIAGNOSIS — G89.29 CHRONIC PAIN OF BOTH KNEES: ICD-10-CM

## 2023-11-30 DIAGNOSIS — M17.12 ARTHRITIS OF KNEE, LEFT: ICD-10-CM

## 2023-11-30 PROCEDURE — 97110 THERAPEUTIC EXERCISES: CPT | Mod: GP

## 2023-11-30 PROCEDURE — 97535 SELF CARE MNGMENT TRAINING: CPT | Mod: GP

## 2023-11-30 NOTE — PROGRESS NOTES
Physical Therapy    Physical Therapy  Treatment      Patient Name: Sherice Dickerson  MRN: [id number]  Today's Date: 11/30/2023  Time Calculation  Start Time: 1430  Stop Time: 1508  Time Calculation (min): 38 min      Insurance  Visit number:  2 (1 of 8)   90 day 11/9/23 - 2/7/23      Assessment:  Pt appears to be making steady progress toward her goals - she reported decreased pain & demo'd improved ROM compared to her evaluation.  Pt tolerated the progressions with the exercise well, but still fatigued quickly particularly on the L LE.  Anticipate pt. will see continued progress with regular HEP adherence, but they would still benefit from regular f/u's to monitor their progress & ensure appropriate progressions.      Plan:  Treatment/Interventions: Blood flow restriction therapy, Dry needling, Education/ Instruction, Electrical stimulation, Gait training, Manual therapy, Neuromuscular re-education, Self care/ home management, Therapeutic activities, Therapeutic exercises  PT Plan: Skilled PT  PT Frequency: 1 time per week  Duration: 8 weeks  Onset Date: 11/09/22  Certification Period Start Date: 11/09/23  Certification Period End Date: 02/07/24  Rehab Potential: Good  Plan of Care Agreement: Patient      Current Problem:   1. Arthritis of knee, left  Follow Up In Physical Therapy      2. Meniscus degeneration, left  Follow Up In Physical Therapy      3. Chronic pain of both knees  Follow Up In Physical Therapy          Subjective    Pt. presented with B knee pain which was likely d/t OA + medial meniscal degeneration resulting from hip weakness, altered body/joint mechanics, & abnormal neuromotor control.       Pt reports she's feeling better & isn't having as much pain.  She still rates the pain as 2-3/10 currently, but still c/o with stairs & squatting.        Objective   Observations:   pt. c/o their 'familiar' pain B medial knee joint line.  Hypertonicity & tenderness noted in the B hip flexors, quadriceps,  "TFL, gluteals, hip ERs, hamstring, & gastroc/soleus mm.    Knee ROM_____ ___R [deg]___ ___L [deg]___    Flex____________ ___137_____ ___136_____   Ext_____________ ___0_____ ___0_____   * = painful      Therapeutic Exercises (91585):  28 minutes  - Supine Bridge   - Supine Bridge + alt marching*  - Sidelying Hip Abduction    - Supine Bridge with red TB*  - Supine B hip ABD/ER with red TB (clamshell)*  - Squat with Chair Touch with red TB*  - Schwinn bike (seat 7) x 8 min  - SL step up + hold on 7\" step (each)*  - SL hip swings (each)*    * = added to HEP.  Sheet with exercise descriptions and images issued.  Red TB issued.  Skilled intervention utilized in the appropriate selection & application of above exercises.  Verbal and tactile cues provided for proper form and technique.  Pt. demonstrated appropriate form & verbalized understanding of optimal technique for above exercises.        Self Care / Home Management ([id number]):  10 minutes    Reviewed & updated current HEP:    Access Code: [email or web address]  URL: [email or web address]  Date: 11/30/2023  Prepared by: Joshua Moeller    Exercises  - Supine Bridge  - 5-7 x weekly - 2-3 sets - 10-15 reps  - Supine Bridge with Resistance Band   - Marching Bridge   - Hooklying Clamshell with Resistance   - Sidelying Hip Abduction  - 5-7 x weekly - 2-3 sets - 10-15 reps  - Squat with Chair Touch  - 5-7 x weekly - 2-3 sets - 10-15 reps  - Squat with Chair Touch and Resistance Loop   - Runner's Step Up/Down  - 5-7 x weekly - 2-3 sets - 10-15 reps  - Hip Swing  - 5-7 x weekly - 2-3 sets - 10-15 reps    Large portion of pt. care time devoted to reviewing pt's current symptoms & determining optimal manual techniques with appropriate HEP modifications.  Discussed importance of regular HEP adherence, & stressed importance of proper form.  Pt. educated about differences between post-exercises soreness vs. increased 'familiar' pain, & reviewed appropriate " progressions/regressions with exercises/activities based on symptoms.  Pt. verbalized understanding & agreement.

## 2023-12-04 ENCOUNTER — TELEPHONE (OUTPATIENT)
Dept: GASTROENTEROLOGY | Facility: CLINIC | Age: 68
End: 2023-12-04
Payer: MEDICARE

## 2023-12-04 NOTE — TELEPHONE ENCOUNTER
Patient has NPV with Tia Hart on 12/11/2023 for colon consult; has order from PCP for colonoscopy; does not need the office appointment.  Please call .  Thank you

## 2023-12-07 ENCOUNTER — APPOINTMENT (OUTPATIENT)
Dept: PHYSICAL THERAPY | Facility: CLINIC | Age: 68
End: 2023-12-07
Payer: MEDICARE

## 2023-12-11 ENCOUNTER — OFFICE VISIT (OUTPATIENT)
Dept: GASTROENTEROLOGY | Facility: CLINIC | Age: 68
End: 2023-12-11
Payer: MEDICARE

## 2023-12-11 ENCOUNTER — TELEPHONE (OUTPATIENT)
Dept: GASTROENTEROLOGY | Facility: CLINIC | Age: 68
End: 2023-12-11

## 2023-12-11 VITALS
HEART RATE: 62 BPM | SYSTOLIC BLOOD PRESSURE: 134 MMHG | HEIGHT: 68 IN | TEMPERATURE: 98.2 F | BODY MASS INDEX: 25.46 KG/M2 | WEIGHT: 168 LBS | DIASTOLIC BLOOD PRESSURE: 84 MMHG

## 2023-12-11 DIAGNOSIS — K58.2 IRRITABLE BOWEL SYNDROME WITH BOTH CONSTIPATION AND DIARRHEA: Primary | ICD-10-CM

## 2023-12-11 DIAGNOSIS — K63.5 POLYP OF COLON, UNSPECIFIED PART OF COLON, UNSPECIFIED TYPE: ICD-10-CM

## 2023-12-11 DIAGNOSIS — Z12.11 COLON CANCER SCREENING: ICD-10-CM

## 2023-12-11 PROCEDURE — 99213 OFFICE O/P EST LOW 20 MIN: CPT | Mod: PO | Performed by: REGISTERED NURSE

## 2023-12-11 PROCEDURE — 3079F DIAST BP 80-89 MM HG: CPT | Performed by: REGISTERED NURSE

## 2023-12-11 PROCEDURE — 3075F SYST BP GE 130 - 139MM HG: CPT | Performed by: REGISTERED NURSE

## 2023-12-11 PROCEDURE — 1160F RVW MEDS BY RX/DR IN RCRD: CPT | Performed by: REGISTERED NURSE

## 2023-12-11 PROCEDURE — 99213 OFFICE O/P EST LOW 20 MIN: CPT | Performed by: REGISTERED NURSE

## 2023-12-11 PROCEDURE — 1159F MED LIST DOCD IN RCRD: CPT | Performed by: REGISTERED NURSE

## 2023-12-11 PROCEDURE — 1036F TOBACCO NON-USER: CPT | Performed by: REGISTERED NURSE

## 2023-12-11 PROCEDURE — 1126F AMNT PAIN NOTED NONE PRSNT: CPT | Performed by: REGISTERED NURSE

## 2023-12-11 RX ORDER — SODIUM, POTASSIUM,MAG SULFATES 17.5-3.13G
1 SOLUTION, RECONSTITUTED, ORAL ORAL EVERY 12 HOURS
Qty: 1 EACH | Refills: 0 | Status: SHIPPED | OUTPATIENT
Start: 2023-12-11 | End: 2024-05-31 | Stop reason: ALTCHOICE

## 2023-12-11 ASSESSMENT — ENCOUNTER SYMPTOMS
JOINT SWELLING: 0
ANAL BLEEDING: 0
NERVOUS/ANXIOUS: 0
EYES NEGATIVE: 1
ENDOCRINE NEGATIVE: 1
DIFFICULTY URINATING: 0
SHORTNESS OF BREATH: 0
DIARRHEA: 0
ABDOMINAL DISTENTION: 0
ABDOMINAL PAIN: 0
UNEXPECTED WEIGHT CHANGE: 0
MYALGIAS: 0
EYE PAIN: 0
ARTHRALGIAS: 0
CONSTIPATION: 0
BLOOD IN STOOL: 0
RECTAL PAIN: 0
APPETITE CHANGE: 0
COUGH: 0
NAUSEA: 0
VOMITING: 0

## 2023-12-11 NOTE — PROGRESS NOTES
REASON FOR VISIT:  Colonoscopy consult     HPI:  Sherice Dickerson is a 68 y.o. female with a history of atrial fibrillation, hypertension, carotid artery disease, mitral regurgitation, hypercholesterolemia and hypothyroidism who presents for colonoscopy consult.  Diagnosed with A- fib within the past year and takes  XARELTO.  Reports that she sometimes goes in and out of A-fib.  She follows with Dr Murry in cardiology. She has been referred for a colonoscopy.  Her last colonoscopy was in January 2019.  History of colon polyps.  Also has a history of irritable bowel syndrome and follows with the [facility] at the [facility].  States that she feels better since she has been going there.  She receives acupuncture periodically for IBS.    Denies any GI concerns at this time.  Moves her bowels usually every day.  Denies urgency, diarrhea, nocturnal bowel movements, constipation, hematochezia or melena.  Denies abdominal pain, nausea, vomiting, reflux or dysphagia.  Her weight and appetite are stable.    Denies a history of NSAID use.  Has never been a smoker.  Does not drink alcohol regularly.    Med list notable for  -Xarelto     Labs   Lab Results   Component Value Date    WBC 4.2 (L) 11/17/2023    HGB 13.2 11/17/2023    HCT 39.8 11/17/2023    MCV 94 11/17/2023     11/17/2023     Lab Results   Component Value Date     11/17/2023    K 3.7 11/17/2023     11/17/2023    CO2 30 11/17/2023    BUN 13 11/17/2023    CREATININE 0.92 11/17/2023    GLUCOSE 89 11/17/2023    CALCIUM 9.3 11/17/2023      Lab Results   Component Value Date    ALKPHOS 52 11/17/2023    ALKPHOS 58 11/23/2022    ALKPHOS 59 09/09/2021    BILITOT 0.8 11/17/2023    BILITOT 0.9 11/23/2022    BILITOT 0.9 09/09/2021    BILIDIR 0.2 01/08/2018    PROT 7.6 11/17/2023    PROT 8.0 11/23/2022    PROT 7.3 09/09/2021    ALT 19 11/17/2023    ALT 19 11/23/2022    ALT 19 09/09/2021    AST 19 11/17/2023    AST 20 11/23/2022    AST 21  "09/09/2021      Lab Results   Component Value Date    INR 1.0 09/09/2021      No results found for: \"IRON\", \"TIBC\"   No results found for: \"FERRITIN\"   Lab Results   Component Value Date    TSH 2.19 11/23/2022        Fam Hx- denies a family history of colon cancer or other GI malignancies.      Prev endoscopic eval:     >> Colonoscopy- 1/9/2019 - Dr Gandara   Impression:            - The examined portion of the ileum was normal.                         - External hemorrhoids.                         - One 3 mm polyp in the descending colon, removed with                          a cold biopsy forceps. Resected and retrieved.  Recommendation:        - Patient has a contact number available for                          emergencies. The signs and symptoms of potential                          delayed complications were discussed with the patient.                          Return to normal activities tomorrow. Written                          discharge instructions were provided to the patient.                         - Resume previous diet.                         - Continue present medications.                         - Await pathology results.  Repeat in 5 years  - Jan 2024   Pathology - hyperplastic polyp     REVIEW OF SYSTEMS    Review of Systems   Constitutional:  Negative for appetite change and unexpected weight change.   HENT:  Negative for mouth sores.    Eyes: Negative.  Negative for pain and visual disturbance.   Respiratory:  Negative for cough and shortness of breath.    Cardiovascular:  Negative for chest pain.   Gastrointestinal:  Negative for abdominal distention, abdominal pain, anal bleeding, blood in stool, constipation, diarrhea, nausea, rectal pain and vomiting.   Endocrine: Negative.    Genitourinary:  Negative for difficulty urinating.   Musculoskeletal:  Negative for arthralgias, joint swelling and myalgias.   Skin:  Negative for rash.   Allergic/Immunologic: Negative for environmental allergies " "and food allergies.   Psychiatric/Behavioral:  The patient is not nervous/anxious.         No Known Allergies    Past Medical History:   Diagnosis Date    Elevated blood-pressure reading, without diagnosis of hypertension 07/06/2020    Elevated blood pressure reading    Nontoxic single thyroid nodule 12/27/2018    Right thyroid nodule       Past Surgical History:   Procedure Laterality Date    COLONOSCOPY  09/21/2016    Complete Colonoscopy    TONSILLECTOMY  06/05/2014    Tonsillectomy    TOTAL THYROIDECTOMY  06/12/2018    Thyroid Surgery Total Thyroidectomy       No family history on file.    Social History     Tobacco Use    Smoking status: Never    Smokeless tobacco: Never   Substance Use Topics    Alcohol use: Not on file       Current Outpatient Medications   Medication Sig Dispense Refill    atorvastatin 20 mg/5 mL (4 mg/mL) suspension Take 1 capsule by mouth 3 times a day with meals.      cholecalciferol (Vitamin D-3) 25 MCG (1000 UT) tablet Take 1 tablet (1,000 Units) by mouth once daily.      estradiol (Estrace) 0.01 % (0.1 mg/gram) vaginal cream Insert 1 Applicatorful (4 g) into the vagina 3 (three) times a week.      levothyroxine (Synthroid, Levoxyl) 88 mcg tablet Take 1 tablet (88 mcg) by mouth once daily in the morning. Take before meals.      liothyronine (Cytomel) 5 mcg tablet Take 1 tablet (5 mcg) by mouth once daily.      lisinopril 10 mg tablet Take 0.5 tablets (5 mg) by mouth once daily at bedtime.      Xarelto 20 mg tablet Take 1 tablet (20 mg) by mouth once daily at bedtime.       No current facility-administered medications for this visit.       PHYSICAL EXAM:  /84 (BP Location: Left arm, Patient Position: Sitting, BP Cuff Size: Adult)   Pulse 62   Temp 36.8 °C (98.2 °F)   Ht 1.715 m (5' 7.5\")   Wt 76.2 kg (168 lb)   BMI 25.92 kg/m²      Physical Exam  Constitutional:       Appearance: Normal appearance.   HENT:      Head: Normocephalic and atraumatic.      Nose: Nose normal. "   Eyes:      Pupils: Pupils are equal, round, and reactive to light.   Cardiovascular:      Rate and Rhythm: Normal rate and regular rhythm.   Pulmonary:      Effort: Pulmonary effort is normal.      Breath sounds: Normal breath sounds.   Abdominal:      General: Abdomen is flat. Bowel sounds are normal. There is no distension.      Palpations: Abdomen is soft. There is no mass.      Tenderness: There is no abdominal tenderness. There is no guarding or rebound.      Hernia: No hernia is present.   Musculoskeletal:         General: Normal range of motion.      Cervical back: Normal range of motion and neck supple.   Skin:     General: Skin is warm and dry.   Neurological:      Mental Status: She is alert and oriented to person, place, and time.   Psychiatric:         Mood and Affect: Mood normal.         Behavior: Behavior normal.          ASSESSMENT    History of colon polyps.  Due for a surveillance colonoscopy  History of atrial fibrillation and is on Xarelto.  Will obtain permission from cardiologist, Dr Murry,  to do the procedure and to hold Xarelto for 3 to 5 days prior to the procedure    PLAN     Schedule Colonoscopy.  My office will send in a prescription for a bowel prep for you to your pharmacy.  Please follow the prep instructions closely.  You may have clear liquids only the day before the procedure. You cannot have anything red or purple. You may have popsicles, hard candy or gum.  You will need a .  You will receive sedation for the procedure by an an anesthesia professional to keep you comfortable during the procedure.   My office will call you to schedule you for the procedure  Office will obtain permission from your cardiologist, Dr. Murry, to hold the Xarelto for 3 to 5 days prior to the procedure  Follow-up in the GI clinic as needed or per physician recommendation      CHI [email or web address]      Date: 12/11/2023  Time: 11:23 AM

## 2023-12-12 ENCOUNTER — TELEPHONE (OUTPATIENT)
Dept: GASTROENTEROLOGY | Facility: CLINIC | Age: 68
End: 2023-12-12
Payer: MEDICARE

## 2023-12-12 NOTE — TELEPHONE ENCOUNTER
Please [place]  [place] [facility] [place]    Patient scheduled for colonoscopy on March 4, 2024 with Dr. Gandara at [facility]; please send Rx for Suprep to [facility]    Patient will need cardiac clearance for Xarelto

## 2023-12-18 ENCOUNTER — TREATMENT (OUTPATIENT)
Dept: PHYSICAL THERAPY | Facility: CLINIC | Age: 68
End: 2023-12-18
Payer: MEDICARE

## 2023-12-18 DIAGNOSIS — M23.307 MENISCUS DEGENERATION, LEFT: ICD-10-CM

## 2023-12-18 DIAGNOSIS — G89.29 CHRONIC PAIN OF BOTH KNEES: ICD-10-CM

## 2023-12-18 DIAGNOSIS — M17.12 ARTHRITIS OF KNEE, LEFT: ICD-10-CM

## 2023-12-18 DIAGNOSIS — M25.562 CHRONIC PAIN OF BOTH KNEES: ICD-10-CM

## 2023-12-18 DIAGNOSIS — M25.561 CHRONIC PAIN OF BOTH KNEES: ICD-10-CM

## 2023-12-18 PROCEDURE — 97110 THERAPEUTIC EXERCISES: CPT | Mod: GP

## 2023-12-18 PROCEDURE — 97535 SELF CARE MNGMENT TRAINING: CPT | Mod: GP

## 2023-12-18 NOTE — PROGRESS NOTES
Physical Therapy    Physical Therapy  Treatment      Patient Name: Sherice Dickerson  MRN: [id number]  Today's Date: 12/18/2023  Time Calculation  Start Time: 1446  Stop Time: 1524  Time Calculation (min): 38 min      Insurance  Visit number:  3 (2 of 8)   90 day 11/9/23 - 2/7/23      Assessment:  Pt continues to make steady progress toward her goals & reported decreased pain & improved functional capacity.  She still demo'd several areas of weakness, but anticipate these will continue to improve with regular HEP adherence.  At this time, the pt appears ready to attempt more IND management of symptoms & will f/u in 4-6 weeks to reassess her progress but will likely D/C from therapy at that time.      Plan:  Treatment/Interventions: Blood flow restriction therapy, Dry needling, Education/ Instruction, Electrical stimulation, Gait training, Manual therapy, Neuromuscular re-education, Self care/ home management, Therapeutic activities, Therapeutic exercises  PT Plan: Skilled PT  PT Frequency: 1 time per week  Duration: 8 weeks  Onset Date: 11/09/22  Certification Period Start Date: 11/09/23  Certification Period End Date: 02/07/24  Rehab Potential: Good  Plan of Care Agreement: Patient      Current Problem:   1. Arthritis of knee, left  Follow Up In Physical Therapy      2. Meniscus degeneration, left  Follow Up In Physical Therapy      3. Chronic pain of both knees  Follow Up In Physical Therapy          Subjective    Pt. presented with B knee pain which was likely d/t OA + medial meniscal degeneration resulting from hip weakness, altered body/joint mechanics, & abnormal neuromotor control.       Pt reports she's feeling pretty good & isn't having much pain.  Pt states she's still having some trouble with the squats, but is doing them & they're generally getting better.       Objective   Observations:   pt. c/o their 'familiar' pain B medial knee joint line.  Hypertonicity & tenderness noted in the B hip flexors,  "quadriceps, TFL, gluteals, hip ERs, hamstring, & gastroc/soleus mm.    Knee ROM_____ ___R [deg]___ ___L [deg]___    Flex____________ ___137_____ ___136_____   Ext_____________ ___0_____ ___0_____   * = painful    Hip MMT (measured via MicroFET2):  Flex  -  R:  18.1 lbs  -  L:  17.5 lbs    Ext  -  R:  41.9 lbs  -  L:  41.1 lbs    ABD  -  R:  18.3 lbs  -  L:  21.7 lbs     Knee MMT (measured via MicroFET2):  Flex  -  R:  29.2 lbs  -  L:  26.3 lbs    Ext  -  R:  53.9 lbs*  -  L:  47.7 lbs    5x sit to stand:  11.79 sec          Therapeutic Exercises ([id number]):  28 minutes  - Supine Bridge   - Supine Bridge + alt marching  - Supine Bridge with red TB  - Supine B hip ABD/ER with red TB (clamshell)  - Sidelying Hip Abduction (each)  - Squat with Chair Touch with red TB  - SL step up + hold on 7\" step (each)  - SL hip swings (each)    * = added to HEP.  Sheet with exercise descriptions and images issued.  Red TB issued.  Skilled intervention utilized in the appropriate selection & application of above exercises.  Verbal and tactile cues provided for proper form and technique.  Pt. demonstrated appropriate form & verbalized understanding of optimal technique for above exercises.        Self Care / Home Management ([id number]):  10 minutes    Reviewed & updated current HEP:    Access Code: [email or web address]  URL: [email or web address]  Date: 11/30/2023  Prepared by: Joshua Moeller    Exercises  - Supine Bridge  - 5-7 x weekly - 2-3 sets - 10-15 reps  - Supine Bridge with Resistance Band   - Marching Bridge   - Hooklying Clamshell with Resistance   - Sidelying Hip Abduction  - 5-7 x weekly - 2-3 sets - 10-15 reps  - Squat with Chair Touch  - 5-7 x weekly - 2-3 sets - 10-15 reps  - Squat with Chair Touch and Resistance Loop   - Runner's Step Up/Down  - 5-7 x weekly - 2-3 sets - 10-15 reps  - Hip Swing  - 5-7 x weekly - 2-3 sets - 10-15 reps    Large portion of pt. care time devoted to reviewing pt's current symptoms & " determining optimal manual techniques with appropriate HEP modifications.  Reviewed importance of regular HEP adherence, & stressed importance of proper form.  Reviewed differences between post-exercises soreness vs. increased 'familiar' pain, & reviewed appropriate progressions/regressions with exercises/activities based on symptoms.  Discussed POC & potential to attempt IND management x 4-6 weeks.  Pt. verbalized understanding & agreement.

## 2023-12-27 DIAGNOSIS — I10 BENIGN ESSENTIAL HYPERTENSION: Primary | ICD-10-CM

## 2023-12-27 RX ORDER — LISINOPRIL 5 MG/1
5 TABLET ORAL DAILY
Qty: 90 TABLET | Refills: 0 | Status: SHIPPED | OUTPATIENT
Start: 2023-12-27 | End: 2024-03-26

## 2023-12-29 ENCOUNTER — OFFICE VISIT (OUTPATIENT)
Dept: UROLOGY | Facility: CLINIC | Age: 68
End: 2023-12-29
Payer: MEDICARE

## 2023-12-29 VITALS
HEART RATE: 74 BPM | WEIGHT: 165 LBS | TEMPERATURE: 97.9 F | SYSTOLIC BLOOD PRESSURE: 133 MMHG | BODY MASS INDEX: 25.9 KG/M2 | DIASTOLIC BLOOD PRESSURE: 79 MMHG | HEIGHT: 67 IN

## 2023-12-29 DIAGNOSIS — Z01.419 WOMEN'S ANNUAL ROUTINE GYNECOLOGICAL EXAMINATION: ICD-10-CM

## 2023-12-29 DIAGNOSIS — N95.8 GENITOURINARY SYNDROME OF MENOPAUSE: ICD-10-CM

## 2023-12-29 PROCEDURE — 3078F DIAST BP <80 MM HG: CPT | Performed by: OBSTETRICS & GYNECOLOGY

## 2023-12-29 PROCEDURE — 1126F AMNT PAIN NOTED NONE PRSNT: CPT | Performed by: OBSTETRICS & GYNECOLOGY

## 2023-12-29 PROCEDURE — 99397 PER PM REEVAL EST PAT 65+ YR: CPT | Performed by: OBSTETRICS & GYNECOLOGY

## 2023-12-29 PROCEDURE — 1159F MED LIST DOCD IN RCRD: CPT | Performed by: OBSTETRICS & GYNECOLOGY

## 2023-12-29 PROCEDURE — 1036F TOBACCO NON-USER: CPT | Performed by: OBSTETRICS & GYNECOLOGY

## 2023-12-29 PROCEDURE — 3075F SYST BP GE 130 - 139MM HG: CPT | Performed by: OBSTETRICS & GYNECOLOGY

## 2023-12-29 PROCEDURE — 1160F RVW MEDS BY RX/DR IN RCRD: CPT | Performed by: OBSTETRICS & GYNECOLOGY

## 2023-12-29 NOTE — PROGRESS NOTES
FOLLOW-UP VISIT     PROBLEM LIST:  1. Annual       HISTORY OF PRESENT ILLNESS:   Sherice Dickerson is a 68 y.o. female for annual pelvic exam  No new issues, doing well.     Using vaginal estrogen to prevent UTI's not sexually active.   Up to date on mammogram.        PAST MEDICAL HISTORY:  Past Medical History:   Diagnosis Date    Elevated blood-pressure reading, without diagnosis of hypertension 07/06/2020    Elevated blood pressure reading    Nontoxic single thyroid nodule 12/27/2018    Right thyroid nodule       PAST SURGICAL HISTORY:  Past Surgical History:   Procedure Laterality Date    COLONOSCOPY  09/21/2016    Complete Colonoscopy    TONSILLECTOMY  06/05/2014    Tonsillectomy    TOTAL THYROIDECTOMY  06/12/2018    Thyroid Surgery Total Thyroidectomy        ALLERGIES:   No Known Allergies     MEDICATIONS:   [unfilled]     SOCIAL HISTORY:  Patient  reports that she has never smoked. She has never used smokeless tobacco.   Social History     Socioeconomic History    Marital status:      Spouse name: Not on file    Number of children: Not on file    Years of education: Not on file    Highest education level: Not on file   Occupational History    Not on file   Tobacco Use    Smoking status: Never    Smokeless tobacco: Never   Substance and Sexual Activity    Alcohol use: Not on file    Drug use: Not on file    Sexual activity: Not on file   Other Topics Concern    Not on file   Social History Narrative    Not on file     Social Determinants of Health     Financial Resource Strain: Not on file   Food Insecurity: Not on file   Transportation Needs: Not on file   Physical Activity: Not on file   Stress: Not on file   Social Connections: Not on file   Intimate Partner Violence: Not on file   Housing Stability: Not on file       FAMILY HISTORY:  No family history on file.    REVIEW OF SYSTEMS:  Constitutional: Negative for fever nd chills. Denies anorexia, weight loss.  Eyes: Negative for visual disturbance.  "  Respiratory: Negative for shortness of breath.    Cardiovascular: Negative for chest pain.   Gastrointestinal: Negative for nausea and vomiting.   Genitourinary: See interval history above.  Skin: Negative for rash.   Neurological: Negative for dizziness and numbness.   Psychiatric/Behavioral: Negative for confusion and decreased concentration.     PHYSICAL EXAM:  Blood pressure 133/79, pulse 74, temperature 36.6 °C (97.9 °F), height 1.702 m (5' 7\"), weight 74.8 kg (165 lb).  Constitutional: Patient appears well-developed and well-nourished. No distress.    Head: Normocephalic and atraumatic.    Neck: Normal range of motion.    Cardiovascular: Normal rate.    Pulmonary/Chest: Effort normal. No respiratory distress.     :   External female genitalia is normal    There are no lesions on vulva, labia major or the labia minora     The clitoral prepuce is normal     The urethra is also normal     Speculum exam done gently and cervix visualized, no friable tissue, cysts/lesions noted     Pap smear completed      Bimanual exam done and uterus is small and adnexa are nontender and without masses appreciated     Musculoskeletal: Normal range of motion.    Neurological: Alert and oriented to person, place, and time.  Psychiatric: Normal mood and affect. Behavior is normal. Thought content normal.        Assessment:      1. Genitourinary syndrome of menopause        2. Women's annual routine gynecological examination            Sherice Dickerson is a 68 y.o. female with normal exam today     Plan:    Follow up yearly      Princess Ray MD MPH        "

## 2024-01-03 ENCOUNTER — APPOINTMENT (OUTPATIENT)
Dept: PHYSICAL THERAPY | Facility: CLINIC | Age: 69
End: 2024-01-03
Payer: MEDICARE

## 2024-01-05 ENCOUNTER — ANCILLARY PROCEDURE (OUTPATIENT)
Dept: RADIOLOGY | Facility: CLINIC | Age: 69
End: 2024-01-05
Payer: MEDICARE

## 2024-01-05 DIAGNOSIS — Z12.31 VISIT FOR SCREENING MAMMOGRAM: ICD-10-CM

## 2024-01-05 PROCEDURE — 77063 BREAST TOMOSYNTHESIS BI: CPT | Performed by: RADIOLOGY

## 2024-01-05 PROCEDURE — 77067 SCR MAMMO BI INCL CAD: CPT

## 2024-01-05 PROCEDURE — 77067 SCR MAMMO BI INCL CAD: CPT | Performed by: RADIOLOGY

## 2024-02-01 ENCOUNTER — APPOINTMENT (OUTPATIENT)
Dept: PHYSICAL THERAPY | Facility: CLINIC | Age: 69
End: 2024-02-01
Payer: MEDICARE

## 2024-02-14 ENCOUNTER — TREATMENT (OUTPATIENT)
Dept: PHYSICAL THERAPY | Facility: CLINIC | Age: 69
End: 2024-02-14
Payer: MEDICARE

## 2024-02-14 DIAGNOSIS — G89.29 CHRONIC PAIN OF BOTH KNEES: ICD-10-CM

## 2024-02-14 DIAGNOSIS — M17.12 ARTHRITIS OF KNEE, LEFT: ICD-10-CM

## 2024-02-14 DIAGNOSIS — M23.307 MENISCUS DEGENERATION, LEFT: ICD-10-CM

## 2024-02-14 DIAGNOSIS — M25.561 CHRONIC PAIN OF BOTH KNEES: ICD-10-CM

## 2024-02-14 DIAGNOSIS — M25.562 CHRONIC PAIN OF BOTH KNEES: ICD-10-CM

## 2024-02-14 PROCEDURE — 97110 THERAPEUTIC EXERCISES: CPT | Mod: GP

## 2024-02-14 NOTE — PROGRESS NOTES
Physical Therapy    Physical Therapy  Treatment      Patient Name: Sherice Dickerson  MRN: [id number]  Today's Date: 2/14/2024  Time Calculation  Start Time: 1535  Stop Time: 1602  Time Calculation (min): 27 min      Insurance  Visit number:  4      Assessment:  Pt returns to therapy after managing her symptoms IND for a couple months - she had a slight flare-up over the past couple weeks while she was down in [place], but this was likely d/t over doing it & should return to baseline within a few days.  Pt's ROM & MMT were all compareable to her baseline or significantly improved since her last f/u.  At this time, the pt appears ready to transition to IND management with her HEP & will D/C from therapy.      Plan:  D/C pt from care.  Pt. instructed to continue with HEP as prescribed & would only f/u in the clinic prn.        Current Problem:   1. Arthritis of knee, left  Follow Up In Physical Therapy      2. Meniscus degeneration, left  Follow Up In Physical Therapy      3. Chronic pain of both knees  Follow Up In Physical Therapy          Subjective    Pt. presented with B knee pain which was likely d/t OA + medial meniscal degeneration resulting from hip weakness, altered body/joint mechanics, & abnormal neuromotor control.       Pt returns to therapy after managing her symptoms IND for a few months.  States she was doing well until she went to [place] the past couple weeks & was walking on the beach & doing a lot of stairs going to a light house.  Pt rates the pain in her knee as 4-5/10 currently.  States she was doing her HEP regularly & had been doing better by increasing the sets/reps.        Objective   Observations:   pt c/o tenderness along the R medial joint line, but was otherwise pain free to bony & soft-tissue landmarks.    Knee ROM_____ ___R [deg]___ ___L [deg]___    Flex____________ ___134_____ ___135_____  Ext_____________ ___0_____ ___0_____   * = painful    Hip MMT (measured via MicroFET2):      Flex            __2/14/24__  -  R:  18.1 lbs  21.1 lbs  -  L:  17.5 lbs  19.5 lbs    Ext  -  R:  41.9 lbs  40.4 lbs  -  L:  41.1 lbs  42.7 lbs    ABD  -  R:  18.3 lbs  20.3 lbs  -  L:  21.7 lbs   21.4 lbs    Knee MMT:    Flex  -  R:  29.2 lbs  33.3 lbs  -  L:  26.3 lbs  36.2 lbs    Ext  -  R:  53.9 lbs*  64.7 lbs  -  L:  47.7 lbs  58.8 lbs          Therapeutic Exercises (64277):  27 minutes  - Reassessment    Reviewed & updated current HEP:    Access Code: [email or web address]  URL: [email or web address]  Date: 11/30/2023  Prepared by: Joshua Moeller    Exercises  - Supine Bridge  - 5-7 x weekly - 2-3 sets - 10-15 reps  - Supine Bridge with Resistance Band   - Marching Bridge   - Hooklying Clamshell with Resistance   - Sidelying Hip Abduction  - 5-7 x weekly - 2-3 sets - 10-15 reps  - Squat with Chair Touch  - 5-7 x weekly - 2-3 sets - 10-15 reps  - Squat with Chair Touch and Resistance Loop   - Runner's Step Up/Down  - 5-7 x weekly - 2-3 sets - 10-15 reps  - Hip Swing  - 5-7 x weekly - 2-3 sets - 10-15 reps    Large portion of pt. care time devoted to reviewing pt's current symptoms & determining optimal manual techniques with appropriate HEP modifications.  Reviewed importance of regular HEP adherence, & stressed importance of proper form.  Reviewed differences between post-exercises soreness vs. increased 'familiar' pain, & reviewed appropriate progressions/regressions with exercises/activities based on symptoms.  Lengthy discussion regarding 'raking leaves' soreness after doing activities you haven't done in a while & likelihood her acute symptoms would return to baseline within a few days.  Discussed POC & potential to transition to IND management of symptoms & pt. verbalized understanding & agreement.

## 2024-02-16 PROBLEM — J01.90 ACUTE SINUSITIS: Status: RESOLVED | Noted: 2023-11-15 | Resolved: 2024-02-16

## 2024-02-16 PROBLEM — K63.5 POLYP OF COLON: Status: ACTIVE | Noted: 2024-02-16

## 2024-02-16 RX ORDER — ALPRAZOLAM 0.5 MG/1
TABLET ORAL
COMMUNITY
Start: 2021-02-04 | End: 2024-02-21 | Stop reason: ALTCHOICE

## 2024-02-19 NOTE — TELEPHONE ENCOUNTER
Left message for patient to call. We need to know if she had reached out to her physician re: Xarelto.

## 2024-02-21 ENCOUNTER — OFFICE VISIT (OUTPATIENT)
Dept: CARDIOLOGY | Facility: CLINIC | Age: 69
End: 2024-02-21
Payer: MEDICARE

## 2024-02-21 VITALS
WEIGHT: 169 LBS | HEIGHT: 68 IN | SYSTOLIC BLOOD PRESSURE: 109 MMHG | DIASTOLIC BLOOD PRESSURE: 69 MMHG | BODY MASS INDEX: 25.61 KG/M2 | OXYGEN SATURATION: 98 % | HEART RATE: 62 BPM

## 2024-02-21 DIAGNOSIS — Z01.810 PRE-OPERATIVE CARDIOVASCULAR EXAMINATION: ICD-10-CM

## 2024-02-21 PROCEDURE — 99214 OFFICE O/P EST MOD 30 MIN: CPT | Performed by: INTERNAL MEDICINE

## 2024-02-21 PROCEDURE — 93010 ELECTROCARDIOGRAM REPORT: CPT | Performed by: INTERNAL MEDICINE

## 2024-02-21 PROCEDURE — 3074F SYST BP LT 130 MM HG: CPT | Performed by: INTERNAL MEDICINE

## 2024-02-21 PROCEDURE — 1126F AMNT PAIN NOTED NONE PRSNT: CPT | Performed by: INTERNAL MEDICINE

## 2024-02-21 PROCEDURE — 3078F DIAST BP <80 MM HG: CPT | Performed by: INTERNAL MEDICINE

## 2024-02-21 PROCEDURE — 93005 ELECTROCARDIOGRAM TRACING: CPT | Performed by: INTERNAL MEDICINE

## 2024-02-21 PROCEDURE — 1036F TOBACCO NON-USER: CPT | Performed by: INTERNAL MEDICINE

## 2024-02-21 ASSESSMENT — PAIN SCALES - GENERAL: PAINLEVEL: 0-NO PAIN

## 2024-02-21 NOTE — PROGRESS NOTES
Primary Care Physician: Shannon Ojeda MD  Date of Visit: 02/21/2024  1:40 PM EST  [place] [facility] [place] [address]     Chief Complaint:   Chief Complaint   Patient presents with    Follow-up        HPI / Summary:   Sherice Dickerson is a 68 y.o. female presents for followup.     History of paroxysmal atrial fibrillation on on monitor 1 to 3% burden he started anticoagulation thereafter.  Will be having an elective colonoscopy.  Stands up and can feel lightheaded  BP labile  High HR with afib around 140, 1.2 % burden.    No tobacco, no ETOH.  Specialty Problems          Cardiology Problems    Familial combined hyperlipidemia    Atrial fibrillation, new onset (CMS/HCC)    Benign essential hypertension    Carotid artery disease (CMS/HCC)    Enlarged heart    Heart murmur    Hypercholesterolemia    Mitral regurgitation    Palpitations        Past Medical History:   Diagnosis Date    Elevated blood-pressure reading, without diagnosis of hypertension 07/06/2020    Elevated blood pressure reading    Nontoxic single thyroid nodule 12/27/2018    Right thyroid nodule          Past Surgical History:   Procedure Laterality Date    COLONOSCOPY  09/21/2016    Complete Colonoscopy    TONSILLECTOMY  06/05/2014    Tonsillectomy    TOTAL THYROIDECTOMY  06/12/2018    Thyroid Surgery Total Thyroidectomy          Social History:   reports that she has never smoked. She has never used smokeless tobacco. She reports that she does not currently use alcohol. She reports that she does not use drugs.      Allergies:  No Known Allergies    Outpatient Medications:  Current Outpatient Medications   Medication Instructions    atorvastatin 20 mg/5 mL (4 mg/mL) suspension 1 capsule, oral, 3 times daily with meals    cholecalciferol (VITAMIN D-3) 1,000 Units, oral, Daily    estradiol (ESTRACE) 4 g, vaginal, 3 times weekly    levothyroxine (SYNTHROID, LEVOXYL) 88 mcg, oral, Daily before breakfast    lisinopril 5 mg, oral, Daily     "sodium,potassium,mag sulfates (Suprep Bowel Prep Kit) 17.5-3.13-1.6 gram recon soln solution 1 bottle, oral, Every 12 hours    Xarelto 20 mg tablet 1 tablet, oral, Nightly       ROS     Physical Exam:  Vitals:    02/21/24 1341   BP: 109/69   BP Location: Left arm   Patient Position: Sitting   BP Cuff Size: Adult   Pulse: 62   SpO2: 98%   Weight: 76.7 kg (169 lb)   Height: 1.715 m (5' 7.5\")     Wt Readings from Last 5 Encounters:   02/21/24 76.7 kg (169 lb)   12/29/23 74.8 kg (165 lb)   12/11/23 76.2 kg (168 lb)   11/15/23 76.2 kg (168 lb)   06/22/23 73.5 kg (162 lb)     Body mass index is 26.08 kg/m².     Well-developed well-nourished female in no acute distress.  JVP was flat.  Normal carotid upstrokes.  Regular rhythm no gallop no murmur.  Lungs are clear.  Abdomen soft.  No dependent edema with intact pedal pulses  Last Labs:  CMP:  Recent Labs     11/17/23  0938 11/23/22  1034 09/09/21  1328 10/01/20  1004 10/10/19  0903    136 137 136 139   K 3.7 4.0 3.7 3.7 3.9    99 100 99 102   CO2 30 30 28 29 30   ANIONGAP 11 11 13 12 11   BUN 13 13 15 13 13   CREATININE 0.92 0.84 0.81 0.82 0.78   EGFR 68  --   --   --   --    GLUCOSE 89 90 65* 89 88     Recent Labs     11/17/23  0938 11/23/22  1034 09/09/21  1328 10/01/20  1004 10/10/19  0903   ALBUMIN 4.3 4.5 4.4 4.2 4.2   ALKPHOS 52 58 59 55 58   ALT 19 19 19 22 17   AST 19 20 21 24 18   BILITOT 0.8 0.9 0.9 0.8 0.8     CBC:  Recent Labs     11/17/23  0938 11/23/22  1034 09/09/21  1328 10/01/20  1004 10/10/19  0903   WBC 4.2* 4.5 3.5* 4.1* 3.5*   HGB 13.2 14.0 13.5 13.3 13.3   HCT 39.8 42.2 39.4 41.1 40.5    299 234 239 213   MCV 94 93 94 94 91     COAG:   Recent Labs     09/09/21  1328   INR 1.0     HEME/ENDO:  Recent Labs     11/23/22  1034 03/26/21  0824 02/05/21  1500 10/10/19  0903 10/03/17  1046   TSH 2.19  --  2.21 1.32 2.15   HGBA1C  --  5.5  --   --   --       CARDIAC: No results for input(s): \"LDH\", \"CKMB\", \"TROPHS\", \"BNP\" in the last 87600 " "hours.    No lab exists for component: \"CK\", \"CKMBP\"  Recent Labs     11/17/23  0938 11/23/22  1034 10/01/20  1004 10/10/19  0903   CHOL 218* 219* 186 196   LDLF  --  114* 94 109*   HDL 81.1 94.0 81.0 75.0   TRIG 82 56 53 60       Last Cardiology Tests:  ECG:      Echo:  Echo Results:  No results found for this or any previous visit from the past 3650 days.       Cath:      Stress Test:  Stress Results:  No results found for this or any previous visit from the past 365 days.         Cardiac Imaging:  BI mammo bilateral screening tomosynthesis  Narrative: Interpreted By:  Ludivina Powell,   STUDY:  BI MAMMO BILATERAL SCREENING TOMOSYNTHESIS;  1/5/2024 2:25 pm      ACCESSION NUMBER(S):  [id number]      ORDERING CLINICIAN:  NESSA MAYBERRY      INDICATION:  Screening. History of bilateral benign excisional biopsies.      COMPARISON:  01/04/2023, 01/05/2022, 10/31/2019, 09/06/2018      FINDINGS:  2D and tomosynthesis images were reviewed at 1 mm slice thickness.      Density:  The breast tissue is heterogeneously dense, which may  obscure small masses.      No suspicious masses or calcifications are identified.      Impression: No mammographic evidence of malignancy.      BI-RADS CATEGORY:      BI-RADS Category:  1 Negative.  Recommendation:  Routine Screening Mammogram in 1 Year.  Recommended Date:  1 Year.  Laterality:  Bilateral.      For any future breast imaging appointments, please call [phone]).          MACRO:  None      Signed by: Ludivina Powell 1/9/2024 2:57 PM  Dictation workstation:   [id number]        Assessment/Plan   This very pleasant 68-year-old female with paroxysmal atrial fibrillation and positional lightheadedness occasionally feels dizzy and it is possible that it relates to A-fib with RVR but the episodes are infrequent I do not think we should start her on new AV boubacar blockers in any event she is very resistant medications.  If her symptoms worsen we will do some further " assessment otherwise I suggest that she stay on the Xarelto and see me again in a year        Orders:  No orders of the defined types were placed in this encounter.     Followup Appts:  Future Appointments   Date Time Provider Department [place]   3/4/2024 12:30 PM Elsa MARKS MD [id number] [place]   11/15/2024  1:00 PM Shannon Ojeda MD [id number] [place]   1/3/2025 10:00 AM Princess Ray MD [facility] [id number] [place]           ____________________________________________________________  Partha Murry MD    Senior Attending Physician  [facility]  [facility]

## 2024-02-22 LAB
ATRIAL RATE: 62 BPM
P AXIS: 69 DEGREES
P OFFSET: 187 MS
P ONSET: 138 MS
PR INTERVAL: 152 MS
Q ONSET: 214 MS
QRS COUNT: 10 BEATS
QRS DURATION: 104 MS
QT INTERVAL: 446 MS
QTC CALCULATION(BAZETT): 452 MS
QTC FREDERICIA: 451 MS
R AXIS: 72 DEGREES
T AXIS: 65 DEGREES
T OFFSET: 437 MS
VENTRICULAR RATE: 62 BPM

## 2024-02-26 ENCOUNTER — TELEPHONE (OUTPATIENT)
Dept: GASTROENTEROLOGY | Facility: CLINIC | Age: 69
End: 2024-02-26
Payer: MEDICARE

## 2024-02-26 NOTE — TELEPHONE ENCOUNTER
Patient left message about medications prior to colonoscopy next Monday 3/4. She is currently taking some enzymes and probiotics that she wants to know if she needs to stop. She also wanted to know if she can drink peppermint or fennel tea before the procedure. She also wants to know about thyroid/bp/cholesterol meds the day of the procedure.

## 2024-03-04 ENCOUNTER — ANESTHESIA (OUTPATIENT)
Dept: GASTROENTEROLOGY | Facility: HOSPITAL | Age: 69
End: 2024-03-04
Payer: MEDICARE

## 2024-03-04 ENCOUNTER — ANESTHESIA EVENT (OUTPATIENT)
Dept: GASTROENTEROLOGY | Facility: HOSPITAL | Age: 69
End: 2024-03-04
Payer: MEDICARE

## 2024-03-04 ENCOUNTER — HOSPITAL ENCOUNTER (OUTPATIENT)
Dept: GASTROENTEROLOGY | Facility: HOSPITAL | Age: 69
Setting detail: OUTPATIENT SURGERY
Discharge: HOME | End: 2024-03-04
Payer: MEDICARE

## 2024-03-04 VITALS
HEART RATE: 60 BPM | OXYGEN SATURATION: 100 % | SYSTOLIC BLOOD PRESSURE: 145 MMHG | DIASTOLIC BLOOD PRESSURE: 69 MMHG | BODY MASS INDEX: 25.74 KG/M2 | TEMPERATURE: 98.2 F | WEIGHT: 164 LBS | RESPIRATION RATE: 16 BRPM | HEIGHT: 67 IN

## 2024-03-04 DIAGNOSIS — Z12.11 COLON CANCER SCREENING: ICD-10-CM

## 2024-03-04 DIAGNOSIS — K63.5 POLYP OF COLON, UNSPECIFIED PART OF COLON, UNSPECIFIED TYPE: ICD-10-CM

## 2024-03-04 PROCEDURE — A45385 PR COLONOSCOPY,REMV LESN,SNARE: Performed by: ANESTHESIOLOGIST ASSISTANT

## 2024-03-04 PROCEDURE — 7100000010 HC PHASE TWO TIME - EACH INCREMENTAL 1 MINUTE

## 2024-03-04 PROCEDURE — 7100000009 HC PHASE TWO TIME - INITIAL BASE CHARGE

## 2024-03-04 PROCEDURE — 45385 COLONOSCOPY W/LESION REMOVAL: CPT | Performed by: INTERNAL MEDICINE

## 2024-03-04 PROCEDURE — 3700000002 HC GENERAL ANESTHESIA TIME - EACH INCREMENTAL 1 MINUTE

## 2024-03-04 PROCEDURE — 2500000004 HC RX 250 GENERAL PHARMACY W/ HCPCS (ALT 636 FOR OP/ED): Performed by: ANESTHESIOLOGIST ASSISTANT

## 2024-03-04 PROCEDURE — 2500000005 HC RX 250 GENERAL PHARMACY W/O HCPCS: Performed by: ANESTHESIOLOGIST ASSISTANT

## 2024-03-04 PROCEDURE — 0753T DGTZ GLS MCRSCP SLD LEVEL IV: CPT | Mod: TC,STJLAB | Performed by: INTERNAL MEDICINE

## 2024-03-04 PROCEDURE — A45385 PR COLONOSCOPY,REMV LESN,SNARE: Performed by: ANESTHESIOLOGY

## 2024-03-04 PROCEDURE — 3700000001 HC GENERAL ANESTHESIA TIME - INITIAL BASE CHARGE

## 2024-03-04 PROCEDURE — 88305 TISSUE EXAM BY PATHOLOGIST: CPT | Performed by: PATHOLOGY

## 2024-03-04 RX ORDER — LIDOCAINE HYDROCHLORIDE 20 MG/ML
INJECTION, SOLUTION EPIDURAL; INFILTRATION; INTRACAUDAL; PERINEURAL AS NEEDED
Status: DISCONTINUED | OUTPATIENT
Start: 2024-03-04 | End: 2024-03-04

## 2024-03-04 RX ORDER — PROPOFOL 10 MG/ML
INJECTION, EMULSION INTRAVENOUS AS NEEDED
Status: DISCONTINUED | OUTPATIENT
Start: 2024-03-04 | End: 2024-03-04

## 2024-03-04 RX ORDER — SODIUM CHLORIDE, SODIUM LACTATE, POTASSIUM CHLORIDE, CALCIUM CHLORIDE 600; 310; 30; 20 MG/100ML; MG/100ML; MG/100ML; MG/100ML
INJECTION, SOLUTION INTRAVENOUS CONTINUOUS PRN
Status: DISCONTINUED | OUTPATIENT
Start: 2024-03-04 | End: 2024-03-04

## 2024-03-04 RX ORDER — SODIUM CHLORIDE 9 MG/ML
20 INJECTION, SOLUTION INTRAVENOUS CONTINUOUS
Status: CANCELLED | OUTPATIENT
Start: 2024-03-04

## 2024-03-04 RX ADMIN — PROPOFOL 200 MG: 10 INJECTION, EMULSION INTRAVENOUS at 11:50

## 2024-03-04 RX ADMIN — SODIUM CHLORIDE, POTASSIUM CHLORIDE, SODIUM LACTATE AND CALCIUM CHLORIDE: 600; 310; 30; 20 INJECTION, SOLUTION INTRAVENOUS at 11:46

## 2024-03-04 RX ADMIN — LIDOCAINE HYDROCHLORIDE 80 MG: 20 INJECTION, SOLUTION EPIDURAL; INFILTRATION; INTRACAUDAL; PERINEURAL at 11:50

## 2024-03-04 ASSESSMENT — PAIN SCALES - GENERAL
PAIN_LEVEL: 0
PAINLEVEL_OUTOF10: 0 - NO PAIN

## 2024-03-04 ASSESSMENT — PAIN - FUNCTIONAL ASSESSMENT
PAIN_FUNCTIONAL_ASSESSMENT: 0-10

## 2024-03-04 NOTE — H&P
Outpatient Hospital Procedure    Patient Profile-Procedures  Initial Info  Patient Demographics  Name Sherice Dickerson  Date of Birth 1955  MRN [id number]  Address   [address] [id number] [address] [address]    Primary Phone Number [phone]  Secondary Phone Number    PCP Shannon Ojeda    Procedures   Colonoscopy      Indication:  Surveillance - remote hx of polyps - hyperplastic polyp 1/19    Primary contact name and number   Extended Emergency Contact Information  Primary Emergency Contact: SharkeyGerri hercules  Address: [address] [address] [place] [address] [place]  Mobile Phone: [phone]  Relation: Daughter  Secondary Emergency Contact: AnniSuzanne  Address: [address]  Home Phone: [phone]  Mobile Phone: [phone]  Relation: Daughter    General Health  Weight   Vitals:    03/04/24 1019   Weight: 74.4 kg (164 lb)     BMI Body mass index is 25.69 kg/m².    Allergies  No Known Allergies    Past Medical History   Past Medical History:   Diagnosis Date    Elevated blood-pressure reading, without diagnosis of hypertension 07/06/2020    Elevated blood pressure reading    Nontoxic single thyroid nodule 12/27/2018    Right thyroid nodule       Provider assessment  Diagnosis  Medication Reviewed - yes  Prior to Admission medications    Medication Sig Start Date End Date Taking? Authorizing Provider   cholecalciferol (Vitamin D-3) 25 MCG (1000 UT) tablet Take 1 tablet (1,000 Units) by mouth once daily. 11/16/22  Yes Historical Provider, MD   estradiol (Estrace) 0.01 % (0.1 mg/gram) vaginal cream Insert 1 Applicatorful (4 g) into the vagina 3 (three) times a week. 12/2/21  Yes Historical Provider, MD   levothyroxine (Synthroid, Levoxyl) 88 mcg tablet Take 1 tablet (88 mcg) by mouth once daily in the morning. Take before meals. 7/31/23  Yes Historical Provider, MD    lisinopril 5 mg tablet TAKE 1 TABLET DAILY 12/27/23  Yes Emile Murry,    Xarelto 20 mg tablet Take 1 tablet (20 mg) by mouth once daily at bedtime. 9/16/22  Yes Historical Provider, MD   sodium,potassium,mag sulfates (Suprep Bowel Prep Kit) 17.5-3.13-1.6 gram recon soln solution Take 1 bottle by mouth every 12 hours. 12/11/23   Tia Hart, APRN-CNP   atorvastatin 20 mg/5 mL (4 mg/mL) suspension Take 1 capsule by mouth 3 times a day with meals.  3/4/24  Historical Provider, MD       This is my H&P    Physical Exam  Physical Exam  Constitutional:       Comments: Awake   HENT:      Head: Normocephalic.   Cardiovascular:      Rate and Rhythm: Normal rate and regular rhythm.   Pulmonary:      Effort: Pulmonary effort is normal.      Breath sounds: Normal breath sounds.   Abdominal:      General: Bowel sounds are normal.      Palpations: Abdomen is soft.   Neurological:      Mental Status: She is alert.   Psychiatric:         Mood and Affect: Mood normal.           Oropharyngeal Classification II (hard and soft palate, upper portion of tonsils anduvula visible)  ASA PS Classification 2  Sedation Plan Deep  Procedure Plan - pre-procedural (re)assesment completed by physician:  discharge/transfer patient when discharge criteria met    Elsa Gandara MD  3/4/2024 11:48 AM

## 2024-03-04 NOTE — ANESTHESIA POSTPROCEDURE EVALUATION
Patient: Sherice Dickerson    Procedure Summary       Date: 03/04/24 Room / Location: [facility]    Anesthesia Start: 1146 Anesthesia Stop: 1216    Procedure: COLONOSCOPY Diagnosis:       Colon cancer screening      Polyp of colon, unspecified part of colon, unspecified type    Scheduled Providers: Elsa MARKS MD Responsible Provider: Navdeep Nielsen DO    Anesthesia Type: MAC ASA Status: 3            Anesthesia Type: MAC    Vitals Value Taken Time   /63 03/04/24 1216   Temp 36.4 °C (97.5 °F) 03/04/24 1216   Pulse 58 03/04/24 1216   Resp 16 03/04/24 1216   SpO2 96 % 03/04/24 1216       Anesthesia Post Evaluation    Patient location during evaluation: PACU  Patient participation: complete - patient participated  Level of consciousness: sleepy but conscious  Pain score: 0  Pain management: adequate  Airway patency: patent  Cardiovascular status: acceptable  Respiratory status: acceptable  Hydration status: acceptable  Postoperative Nausea and Vomiting: none    No notable events documented.

## 2024-03-04 NOTE — ANESTHESIA PREPROCEDURE EVALUATION
Patient: Sherice Dickerson    Procedure Information       Date/Time: 03/04/24 1120    Scheduled providers: Elsa MARKS MD    Procedure: COLONOSCOPY    Location: [facility]            Relevant Problems   Cardiovascular   (+) Atrial fibrillation, new onset (CMS/HCC)   (+) Benign essential hypertension   (+) Carotid artery disease (CMS/HCC)   (+) Familial combined hyperlipidemia   (+) Heart murmur   (+) Hypercholesterolemia   (+) Mitral regurgitation      Endocrine   (+) Hypothyroidism   (+) Malignant neoplasm of thyroid gland (CMS/HCC)   (+) Nontoxic multinodular goiter   (+) Postoperative hypothyroidism      GI   (+) Irritable bowel syndrome      Neuro/Psych   (+) Carotid artery disease (CMS/HCC)   (+) Generalized anxiety disorder      Hematology   (+) Anemia      Musculoskeletal   (+) Chondromalacia of patella   (+) Localized osteoarthrosis      Other   (+) Arthritis       Clinical information reviewed:   Tobacco  Allergies  Meds  Problems  Med Hx  Surg Hx   Fam Hx  Soc   Hx        NPO Detail:  No data recorded     Physical Exam    Airway  Mallampati: II  TM distance: >3 FB     Cardiovascular   Rhythm: regular  Rate: normal     Dental - normal exam     Pulmonary   Breath sounds clear to auscultation     Abdominal          Anesthesia Plan    History of general anesthesia?: yes  History of complications of general anesthesia?: no    ASA 3     MAC     intravenous induction   Postoperative administration of opioids is intended.  Anesthetic plan and risks discussed with patient.

## 2024-03-04 NOTE — DISCHARGE INSTRUCTIONS
Patient Instructions Post Endoscopy Procedure      The anesthetics, sedatives or narcotics which were given to you today will be acting in your body for the next 24 hours, so you might feel a little sleepy or groggy.  This feeling should slowly wear off. Carefully read and follow the instructions.     You received sedation today:  - Do not drive or operate any machinery or power tools of any kind.   - No alcoholic beverages today, not even beer or wine.  - Do not make any important decisions or sign any legal documents.  - No over the counter medications that contain alcohol or that may cause drowsiness.    While it is common to experience mild to moderate abdominal distention, gas, or belching after your procedure, if any of these symptoms occur following discharge from the GI Lab or within one week of having your procedure, call the [facility] [place] to be advised whether a visit to your nearest Urgent Care or Emergency Department is indicated.  Take this paper with you if you go.   - If you develop an allergic reaction to the medications that were given during your procedure such as difficulty breathing, rash, hives, severe nausea, vomiting or lightheadedness.  - If you experience chest pain, shortness of breath, severe abdominal pain, fevers and chills.  -If you develop signs and symptoms of bleeding such as blood in your spit, if your stools turn black, tarry, or bloody  - If you have not urinated within 8 hours following your procedure.  - If your IV site becomes painful, red, inflamed, or looks infected.    If you received a biopsy/polypectomy the following instructions apply below:  _x_ Do not use non-steroidal medications or anti-coagulants for one week following your procedure. (Examples of these types of medications are: Advil, Arthrotec, Aleve, Coumadin, Ecotrin, Heparin, Ibuprofen, Indocin, Motrin, Naprosyn, Nuprin, Plavix, Vioxx, and Voltarin, or their generic forms.  This list is not  all-inclusive.  Check with your physician or pharmacist before resuming medications.)   _x_ Eat a soft diet today.  Avoid foods that are poorly digested for the next 24 hours.  These foods would include: nuts, beans, lettuce, red meats, and fried foods. Start with liquids and advance your diet as tolerated, gradually work up to eating solids.   __ You can restart your ASA tomorrow  _x_ You can resume your anticoagulation therapy - Xeralto in 2 days    Your physician recommends the additional following instructions:    -You have a contact number available for emergencies. The signs and symptoms of potential delayed complications were discussed with you. You may return to normal activities tomorrow.  -Resume your previous diet or other if specified.  -Continue your present medications.   -We are waiting for your pathology results, if applicable. The results will be available in [employer]. I will send you a message with any recommendations.  -The findings and recommendations have been discussed with you and/or family.  -Please see Medication Reconciliation Form for new medication/medications prescribed.     In the event of an emergency please go to the closest Emergency Department or call Dr. Gandara at [phone]

## 2024-03-11 LAB
LABORATORY COMMENT REPORT: NORMAL
PATH REPORT.FINAL DX SPEC: NORMAL
PATH REPORT.GROSS SPEC: NORMAL
PATH REPORT.RELEVANT HX SPEC: NORMAL
PATH REPORT.TOTAL CANCER: NORMAL

## 2024-03-26 DIAGNOSIS — I10 BENIGN ESSENTIAL HYPERTENSION: ICD-10-CM

## 2024-03-26 RX ORDER — LISINOPRIL 5 MG/1
5 TABLET ORAL DAILY
Qty: 90 TABLET | Refills: 2 | Status: SHIPPED | OUTPATIENT
Start: 2024-03-26

## 2024-05-09 ENCOUNTER — APPOINTMENT (OUTPATIENT)
Dept: SLEEP MEDICINE | Facility: CLINIC | Age: 69
End: 2024-05-09
Payer: MEDICARE

## 2024-05-19 DIAGNOSIS — E78.00 HYPERCHOLESTEROLEMIA: Primary | ICD-10-CM

## 2024-05-20 DIAGNOSIS — I77.9 CAROTID ARTERY DISEASE, UNSPECIFIED LATERALITY, UNSPECIFIED TYPE (CMS-HCC): Primary | ICD-10-CM

## 2024-05-20 RX ORDER — RIVAROXABAN 20 MG/1
20 TABLET, FILM COATED ORAL DAILY
Qty: 90 TABLET | Refills: 3 | Status: SHIPPED | OUTPATIENT
Start: 2024-05-20

## 2024-05-20 RX ORDER — ATORVASTATIN CALCIUM 20 MG/1
20 TABLET, FILM COATED ORAL DAILY
Qty: 90 TABLET | Refills: 3 | Status: SHIPPED | OUTPATIENT
Start: 2024-05-20

## 2024-05-31 ENCOUNTER — LAB (OUTPATIENT)
Dept: LAB | Facility: LAB | Age: 69
End: 2024-05-31
Payer: MEDICARE

## 2024-05-31 ENCOUNTER — OFFICE VISIT (OUTPATIENT)
Dept: SLEEP MEDICINE | Facility: CLINIC | Age: 69
End: 2024-05-31
Payer: MEDICARE

## 2024-05-31 VITALS
BODY MASS INDEX: 26.24 KG/M2 | DIASTOLIC BLOOD PRESSURE: 71 MMHG | TEMPERATURE: 97.8 F | HEIGHT: 67 IN | HEART RATE: 64 BPM | OXYGEN SATURATION: 98 % | WEIGHT: 167.2 LBS | SYSTOLIC BLOOD PRESSURE: 125 MMHG

## 2024-05-31 DIAGNOSIS — Z72.821 INADEQUATE SLEEP HYGIENE: ICD-10-CM

## 2024-05-31 DIAGNOSIS — I10 BENIGN ESSENTIAL HYPERTENSION: ICD-10-CM

## 2024-05-31 DIAGNOSIS — G47.29: ICD-10-CM

## 2024-05-31 DIAGNOSIS — G47.09 OTHER INSOMNIA: Primary | ICD-10-CM

## 2024-05-31 DIAGNOSIS — E83.10 DISORDER OF IRON METABOLISM: ICD-10-CM

## 2024-05-31 DIAGNOSIS — G25.81 RLS (RESTLESS LEGS SYNDROME): ICD-10-CM

## 2024-05-31 LAB
FERRITIN SERPL-MCNC: 55 NG/ML (ref 8–150)
IRON SATN MFR SERPL: 32 % (ref 25–45)
IRON SERPL-MCNC: 134 UG/DL (ref 35–150)
TIBC SERPL-MCNC: 418 UG/DL (ref 240–445)
UIBC SERPL-MCNC: 284 UG/DL (ref 110–370)

## 2024-05-31 PROCEDURE — 36415 COLL VENOUS BLD VENIPUNCTURE: CPT

## 2024-05-31 PROCEDURE — 82728 ASSAY OF FERRITIN: CPT

## 2024-05-31 PROCEDURE — G2211 COMPLEX E/M VISIT ADD ON: HCPCS | Performed by: STUDENT IN AN ORGANIZED HEALTH CARE EDUCATION/TRAINING PROGRAM

## 2024-05-31 PROCEDURE — 3078F DIAST BP <80 MM HG: CPT | Performed by: STUDENT IN AN ORGANIZED HEALTH CARE EDUCATION/TRAINING PROGRAM

## 2024-05-31 PROCEDURE — 1159F MED LIST DOCD IN RCRD: CPT | Performed by: STUDENT IN AN ORGANIZED HEALTH CARE EDUCATION/TRAINING PROGRAM

## 2024-05-31 PROCEDURE — 1036F TOBACCO NON-USER: CPT | Performed by: STUDENT IN AN ORGANIZED HEALTH CARE EDUCATION/TRAINING PROGRAM

## 2024-05-31 PROCEDURE — 1160F RVW MEDS BY RX/DR IN RCRD: CPT | Performed by: STUDENT IN AN ORGANIZED HEALTH CARE EDUCATION/TRAINING PROGRAM

## 2024-05-31 PROCEDURE — 83550 IRON BINDING TEST: CPT

## 2024-05-31 PROCEDURE — 3074F SYST BP LT 130 MM HG: CPT | Performed by: STUDENT IN AN ORGANIZED HEALTH CARE EDUCATION/TRAINING PROGRAM

## 2024-05-31 PROCEDURE — 99204 OFFICE O/P NEW MOD 45 MIN: CPT | Performed by: STUDENT IN AN ORGANIZED HEALTH CARE EDUCATION/TRAINING PROGRAM

## 2024-05-31 PROCEDURE — 83540 ASSAY OF IRON: CPT

## 2024-05-31 ASSESSMENT — ENCOUNTER SYMPTOMS
RESPIRATORY NEGATIVE: 1
CARDIOVASCULAR NEGATIVE: 1
CONSTITUTIONAL NEGATIVE: 1
NEUROLOGICAL NEGATIVE: 1
PSYCHIATRIC NEGATIVE: 1

## 2024-05-31 NOTE — PROGRESS NOTES
Patient: Sherice Dickerson    [id number]  : 1955 -- AGE 69 y.o.    Provider: Joshua Silver MD     Location [facility]   Service Date: 2024              [facility]  New Visit Note      HISTORY OF PRESENT ILLNESS     The patient's referring provider is: Shannon Ojeda MD    HISTORY OF PRESENT ILLNESS   Sherice Dickerson is a 69 y.o. female with h/o Insomnia and a-fib, HTN  who presents to a [facility] for a sleep medicine evaluation with concerns of New Patient Visit (Having trouble sleeping).     Past Sleep History  Patient has the following sleep-related diagnoses: none. Prior sleep study results: not done    Current History    On today's visit, the patient reports that it's been about 2 years now that she is having trouble sleeping.  She is  and lives alone.  She reports that since 2 years ago, her longterm plan has changed quite a bit partially due to the COVID pandemic.  Her kids are around town and some are out of town and some are out of state.  She still has social interactions but she has very little daily routine.  She loves to exercise but her knee has been bothering her.  She does have a stationary bike at home which she said that she could use.  There are times where she falls asleep during the day because she has such a hard time falling asleep at night and sometimes may have problem staying asleep.  She often falls asleep in front of the TV, then she would wake up and go to her bedroom and read but then she often then has trouble falling asleep when she wants to. She has tried Melatonin and she takes it at bedtime (2 mg).  She does have some RLS symptoms, but have not had any work-up done for it before and have not tried any medications.    Sleep-related ROS:    Sleep Initiation: takes  min  to fall asleep  Problems going to sleep associated with: rumination/thoughts/worries and frustration with not  sleeping    Sleep Maintenance: wakes up about 2 times per night, easily returns to sleep after awakening, and prolonged awakenings  Problems staying asleep associated with: Problems Staying Asleep: thoughts/worry and nocturia    Breathing during sleep: no symptoms of snoring or concerns of breathing at night    RLS screen:  RLSSCREEN: - Sensations: Patient has unusual sensations in their extremities that cause an urge to move them   - Frequency: daily   - Relief: Symptoms ARE/ARENOT: are relieved with movement   - Circadian: Symptoms ARE/ARENOT: are typically improved later in the night.   - Movement: Patient has not been told that their legs kick or jerk during sleep    Daytime Symptoms  On awakening patient reports: wake unrefreshed and feels sleepy    Patient reports DAYTIME SYMPTOMS: excessively sleepy during the day  Patient denies daytime symptoms including: Denies: feeling sleepy when driving  Fatigue: symptoms bothersome, but easily able to carry out all usual work/school/family activities    REVIEW OF SYSTEMS     REVIEW OF SYSTEMS  Review of Systems   Constitutional: Negative.    HENT: Negative.     Respiratory: Negative.     Cardiovascular: Negative.    Genitourinary: Negative.    Skin: Negative.    Neurological: Negative.    Psychiatric/Behavioral: Negative.       SLEEP ROS: excessive daytime sleepiness      ALLERGIES AND MEDICATIONS     ALLERGIES  No Known Allergies    MEDICATIONS  Current Outpatient Medications   Medication Sig Dispense Refill    atorvastatin (Lipitor) 20 mg tablet TAKE 1 TABLET DAILY 90 tablet 3    cholecalciferol (Vitamin D-3) 25 MCG (1000 UT) tablet Take 1 tablet (1,000 Units) by mouth once daily.      estradiol (Estrace) 0.01 % (0.1 mg/gram) vaginal cream Insert 1 Applicatorful (4 g) into the vagina 3 (three) times a week.      levothyroxine (Synthroid, Levoxyl) 88 mcg tablet Take 1 tablet (88 mcg) by mouth once daily in the morning. Take before meals.      lisinopril 5 mg tablet  "TAKE 1 TABLET DAILY 90 tablet 2    Xarelto 20 mg tablet TAKE 1 TABLET DAILY 90 tablet 3     No current facility-administered medications for this visit.         PAST HISTORY     PAST MEDICAL HISTORY  She  has a past medical history of Elevated blood-pressure reading, without diagnosis of hypertension (07/06/2020) and Nontoxic single thyroid nodule (12/27/2018).    PAST SURGICAL HISTORY:  Past Surgical History:   Procedure Laterality Date    COLONOSCOPY  09/21/2016    Complete Colonoscopy    TONSILLECTOMY  06/05/2014    Tonsillectomy    TOTAL THYROIDECTOMY  06/12/2018    Thyroid Surgery Total Thyroidectomy       FAMILY HISTORY  Family History   Problem Relation Name Age of Onset    Breast cancer Mother's Sister         She does not have a family history of sleep disorder.    SOCIAL HISTORY  She  reports that she has never smoked. She has never used smokeless tobacco. She reports that she does not currently use alcohol. She reports that she does not use drugs. She currently lives alone.     Caffeine consumption: Yes, sometimes  Alcohol consumption: Yes, sometimes  Smoking: No  Marijuana: No      PHYSICAL EXAM     VITAL SIGNS: /71 (BP Location: Right arm, Patient Position: Sitting, BP Cuff Size: Adult)   Pulse 64   Temp 36.6 °C (97.8 °F) (Temporal)   Ht 1.702 m (5' 7\")   Wt 75.8 kg (167 lb 3.2 oz)   SpO2 98%   BMI 26.19 kg/m²      CURRENT WEIGHT:   Vitals:    05/31/24 1001   Weight: 75.8 kg (167 lb 3.2 oz)     Body mass index is 26.19 kg/m².  PREVIOUS WEIGHTS:  Wt Readings from Last 3 Encounters:   05/31/24 75.8 kg (167 lb 3.2 oz)   03/04/24 74.4 kg (164 lb)   02/21/24 76.7 kg (169 lb)       Physical Exam  Vitals reviewed.   Constitutional:       General: She is not in acute distress.     Appearance: Normal appearance. She is well-developed and normal weight.   HENT:      Head: Normocephalic and atraumatic.      Nose: Nose normal. No congestion or rhinorrhea.      Mouth/Throat:      Mouth: Mucous membranes " are moist.      Pharynx: Oropharynx is clear. No oropharyngeal exudate.   Eyes:      General: No scleral icterus.     Extraocular Movements: Extraocular movements intact.      Conjunctiva/sclera: Conjunctivae normal.      Pupils: Pupils are equal, round, and reactive to light.   Neck:      Thyroid: No thyroid mass or thyroid tenderness.      Vascular: No JVD.   Cardiovascular:      Rate and Rhythm: Normal rate.      Pulses: Normal pulses.   Pulmonary:      Effort: Pulmonary effort is normal. No respiratory distress.   Musculoskeletal:      Cervical back: Normal range of motion and neck supple. No rigidity or tenderness.   Lymphadenopathy:      Cervical: No cervical adenopathy.   Neurological:      Mental Status: She is alert and oriented to person, place, and time.   Psychiatric:         Mood and Affect: Mood normal.         Behavior: Behavior normal.         Thought Content: Thought content normal.       PHYSICAL EXAM: MODIFIED MALLAMPATI SCORE: II (hard and soft palate, upper portion of tonsils anduvula visible)  TONGUE SCALLOPING: without scalloping      RESULTS/DATA     Bicarbonate (mmol/L)   Date Value   11/17/2023 30   11/23/2022 30   09/09/2021 28   10/01/2020 29             ASSESSMENT/PLAN     Ms. Dickerson is a 69 y.o. female and She was referred to the [facility] for evaluation of Insomnia and RLS    Problem List, Orders, Assessment, Recommendations:  Problem List Items Addressed This Visit             ICD-10-CM    Benign essential hypertension I10     BP Readings from Last 1 Encounters:   05/31/24 125/71     - doing well, asymptomatic  - discussed at length the impact of untreated PHILLY and BP control  - continue current management and follow-up with PCP          Other insomnia - Primary G47.09     Likely due to change of daily routine and social interaction 2/2 COVID pandemic  Exacerbated by performance anxiety of wanting to sleep and to make sleep happen  Aggravating factors  "including lack of daily structure and inadequate sleep hygiene  We will start with avoiding naps (gael. 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

## 2024-06-01 NOTE — ASSESSMENT & PLAN NOTE
Possible sleep wake cycle disruption at this time  Our primary focus for now will be gaining some sleep back, rebuilding confidence for sleep, then we will worry about adjusting the biological clock as our next step

## 2024-06-01 NOTE — ASSESSMENT & PLAN NOTE
"Likely due to change of daily routine and social interaction 2/2 COVID pandemic  Exacerbated by performance anxiety of wanting to sleep and to make sleep happen  Aggravating factors including lack of daily structure and inadequate sleep hygiene  We will start with avoiding naps (gael. Falling asleep in front of the TV in the evening), not reading in bed, \"let sleep come to her\"  Melatonin can be taken with dinner  "

## 2024-06-01 NOTE — ASSESSMENT & PLAN NOTE
BP Readings from Last 1 Encounters:   05/31/24 125/71     - doing well, asymptomatic  - discussed at length the impact of untreated PHILLY and BP control  - continue current management and follow-up with PCP

## 2024-06-24 ENCOUNTER — APPOINTMENT (OUTPATIENT)
Dept: SLEEP MEDICINE | Facility: CLINIC | Age: 69
End: 2024-06-24
Payer: MEDICARE

## 2024-06-24 VITALS
TEMPERATURE: 97.7 F | SYSTOLIC BLOOD PRESSURE: 119 MMHG | HEART RATE: 80 BPM | HEIGHT: 67 IN | WEIGHT: 167.8 LBS | BODY MASS INDEX: 26.34 KG/M2 | DIASTOLIC BLOOD PRESSURE: 69 MMHG

## 2024-06-24 DIAGNOSIS — G47.29: ICD-10-CM

## 2024-06-24 DIAGNOSIS — G25.81 RLS (RESTLESS LEGS SYNDROME): ICD-10-CM

## 2024-06-24 DIAGNOSIS — G47.30 SLEEP APNEA, UNSPECIFIED TYPE: Primary | ICD-10-CM

## 2024-06-24 DIAGNOSIS — I10 BENIGN ESSENTIAL HYPERTENSION: ICD-10-CM

## 2024-06-24 DIAGNOSIS — G47.09 OTHER INSOMNIA: ICD-10-CM

## 2024-06-24 DIAGNOSIS — Z72.821 INADEQUATE SLEEP HYGIENE: ICD-10-CM

## 2024-06-24 PROCEDURE — G2211 COMPLEX E/M VISIT ADD ON: HCPCS | Performed by: STUDENT IN AN ORGANIZED HEALTH CARE EDUCATION/TRAINING PROGRAM

## 2024-06-24 PROCEDURE — 99214 OFFICE O/P EST MOD 30 MIN: CPT | Performed by: STUDENT IN AN ORGANIZED HEALTH CARE EDUCATION/TRAINING PROGRAM

## 2024-06-24 PROCEDURE — 1160F RVW MEDS BY RX/DR IN RCRD: CPT | Performed by: STUDENT IN AN ORGANIZED HEALTH CARE EDUCATION/TRAINING PROGRAM

## 2024-06-24 PROCEDURE — 3074F SYST BP LT 130 MM HG: CPT | Performed by: STUDENT IN AN ORGANIZED HEALTH CARE EDUCATION/TRAINING PROGRAM

## 2024-06-24 PROCEDURE — 1159F MED LIST DOCD IN RCRD: CPT | Performed by: STUDENT IN AN ORGANIZED HEALTH CARE EDUCATION/TRAINING PROGRAM

## 2024-06-24 PROCEDURE — 3078F DIAST BP <80 MM HG: CPT | Performed by: STUDENT IN AN ORGANIZED HEALTH CARE EDUCATION/TRAINING PROGRAM

## 2024-06-24 PROCEDURE — 1036F TOBACCO NON-USER: CPT | Performed by: STUDENT IN AN ORGANIZED HEALTH CARE EDUCATION/TRAINING PROGRAM

## 2024-06-24 RX ORDER — FERROUS SULFATE 325(65) MG
TABLET, DELAYED RELEASE (ENTERIC COATED) ORAL
Qty: 30 TABLET | Refills: 3 | Status: SHIPPED | OUTPATIENT
Start: 2024-06-24

## 2024-06-24 ASSESSMENT — SLEEP AND FATIGUE QUESTIONNAIRES
DIFFICULTY_STAYING_ASLEEP: MODERATE
HOW LIKELY ARE YOU TO NOD OFF OR FALL ASLEEP WHILE LYING DOWN TO REST IN THE AFTERNOON WHEN CIRCUMSTANCES PERMIT: SLIGHT CHANCE OF DOZING
SITING INACTIVE IN A PUBLIC PLACE LIKE A CLASS ROOM OR A MOVIE THEATER: SLIGHT CHANCE OF DOZING
SATISFACTION_WITH_CURRENT_SLEEP_PATTERN: SATISFIED
HOW LIKELY ARE YOU TO NOD OFF OR FALL ASLEEP WHILE WATCHING TV: MODERATE CHANCE OF DOZING
HOW LIKELY ARE YOU TO NOD OFF OR FALL ASLEEP WHEN YOU ARE A PASSENGER IN A CAR FOR AN HOUR WITHOUT A BREAK: SLIGHT CHANCE OF DOZING
WAKING_TOO_EARLY: MODERATE
DIFFICULTY_FALLING_ASLEEP: MILD
ESS-CHAD TOTAL SCORE: 6
HOW LIKELY ARE YOU TO NOD OFF OR FALL ASLEEP WHILE SITTING AND READING: SLIGHT CHANCE OF DOZING
HOW LIKELY ARE YOU TO NOD OFF OR FALL ASLEEP WHILE SITTING AND TALKING TO SOMEONE: WOULD NEVER DOZE
SLEEP_PROBLEM_INTERFERES_DAILY_ACTIVITIES: SOMEWHAT
SLEEP_PROBLEM_NOTICEABLE_TO_OTHERS: MUCH
WORRIED_DISTRESSED_DUE_TO_SLEEP: SOMEWHAT
HOW LIKELY ARE YOU TO NOD OFF OR FALL ASLEEP WHILE SITTING QUIETLY AFTER LUNCH WITHOUT ALCOHOL: WOULD NEVER DOZE
HOW LIKELY ARE YOU TO NOD OFF OR FALL ASLEEP IN A CAR, WHILE STOPPED FOR A FEW MINUTES IN TRAFFIC: WOULD NEVER DOZE

## 2024-06-24 ASSESSMENT — ENCOUNTER SYMPTOMS
CARDIOVASCULAR NEGATIVE: 1
CONSTITUTIONAL NEGATIVE: 1
PSYCHIATRIC NEGATIVE: 1
RESPIRATORY NEGATIVE: 1
NEUROLOGICAL NEGATIVE: 1

## 2024-06-24 NOTE — ASSESSMENT & PLAN NOTE
Possible sleep wake cycle disruption at this time  Our primary focus for now will be gaining some sleep back, rebuilding confidence for sleep, then we will worry about adjusting the biological clock as our next step    - doesn't seem to be a problem at this time, and we may need to address it in the future should she wants to sleep/wake at a certain time

## 2024-06-24 NOTE — ASSESSMENT & PLAN NOTE
Start with stimulus control and reserving bed for sleep only -> doing well and she knows the right thing to do and will continue to practice good sleep hygiene

## 2024-06-24 NOTE — ASSESSMENT & PLAN NOTE
"Likely due to change of daily routine and social interaction 2/2 COVID pandemic  Exacerbated by performance anxiety of wanting to sleep and to make sleep happen  Aggravating factors including lack of daily structure and inadequate sleep hygiene  We will start with avoiding naps (gael. Falling asleep in front of the TV in the evening), not reading in bed, \"let sleep come to her\"  Melatonin can be taken with dinner    - after practicing better sleep hygiene and taking melatonin at a different time, now her sleep onset problem is pretty much resolved.  She goes to sleep around MN and falls asleep generally within 30 min.  She wakes around 8:30 am and feels fine.  "

## 2024-06-24 NOTE — ASSESSMENT & PLAN NOTE
BP Readings from Last 1 Encounters:   06/24/24 119/69     - doing well, asymptomatic  - discussed at length the impact of untreated PHILLY and BP control  - continue current management and follow-up with PCP

## 2024-06-24 NOTE — ASSESSMENT & PLAN NOTE
- we will start work-up with Home sleep apnea test (HSAT)  - lengthy discussion on PHILLY and sleep study education as well as the tips to be successful with the sleep study  - patient voiced understanding

## 2024-06-24 NOTE — PROGRESS NOTES
" Patient: Sherice Dickerson    [id number]  : 1955 -- AGE 69 y.o.    Provider: Joshua Silver MD     Location [facility]   Service Date: 2024              [facility]  Followup Visit Note    HISTORY OF PRESENT ILLNESS     HISTORY OF PRESENT ILLNESS   Sherice Dickerson is a 69 y.o. female with h/o Insomnia who presents to a [facility] for followup.     Assessment and plan from last visit: 2024    Ms. Dickerson is a 69 y.o. female and She was referred to the [facility] for evaluation of Insomnia and RLS     Problem List, Orders, Assessment, Recommendations:  Problem List Items Addressed This Visit               ICD-10-CM     Benign essential hypertension I10           BP Readings from Last 1 Encounters:   24 125/71      - doing well, asymptomatic  - discussed at length the impact of untreated PHILLY and BP control  - continue current management and follow-up with PCP            Other insomnia - Primary G47.09       Likely due to change of daily routine and social interaction 2/2 COVID pandemic  Exacerbated by performance anxiety of wanting to sleep and to make sleep happen  Aggravating factors including lack of daily structure and inadequate sleep hygiene  We will start with avoiding naps (gael. 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       Possible sleep wake cycle disruption at this time  Our primary focus for now will be gaining some sleep back, rebuilding confidence for sleep, then we will worry about adjusting the biological clock as our next step           RLS (restless legs syndrome) G25.81       We will get ferritin and iron level first          "   Other Visit Diagnoses           Codes     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  She goes to sleep around MN and falls asleep generally within 30 min.  She wakes around 8:30 am and feels fine.    She does wake up every 2-3 hours though and that's a bit bothersome    RLS Followup:   Still symptomatic.  We haven't started iron supplement yet    Daytime Symptoms    Patient reports DAYTIME SYMPTOMS: no daytime symptoms  Patient denies daytime symptoms including: Denies: excessive daytime sleepiness    Naps: No  Fatigue: denies feeling fatigue    ESS: 6  MARTA: 14  FOSQ: 35    REVIEW OF SYSTEMS     REVIEW OF SYSTEMS  Review of Systems   Constitutional: Negative.    HENT: Negative.     Respiratory: Negative.     Cardiovascular: Negative.    Genitourinary: Negative.    Skin: Negative.    Neurological: Negative.    Psychiatric/Behavioral: Negative.           ALLERGIES AND MEDICATIONS     ALLERGIES  No Known Allergies    MEDICATIONS: She has a current medication list which includes the following prescription(s): atorvastatin - TAKE 1 TABLET DAILY, cholecalciferol - Take 1 tablet (1,000 Units) by mouth once daily, estradiol - Insert 1 Applicatorful (4 g) into the vagina 3 (three) times a week, levothyroxine - Take 1 tablet (88 mcg) by mouth once daily in the morning. Take before meals, lisinopril - TAKE 1 TABLET DAILY, xarelto - TAKE 1 TABLET DAILY, and ferrous sulfate - Do not crush, chew, or split.    PAST MEDICAL HISTORY : She  has a past medical history of Elevated blood-pressure reading, without diagnosis of hypertension (07/06/2020) and Nontoxic single thyroid nodule (12/27/2018).    PAST SURGICAL HISTORY: She  has a  "past surgical history that includes Tonsillectomy (06/05/2014); Colonoscopy (09/21/2016); and Total thyroidectomy (06/12/2018).     FAMILY HISTORY: No changes since previous visit. Otherwise non-contributory as charted.     SOCIAL HISTORY  She  reports that she has never smoked. She has never used smokeless tobacco. She reports that she does not currently use alcohol. She reports that she does not use drugs.       PHYSICAL EXAM     VITAL SIGNS: /69 (BP Location: Right arm, Patient Position: Sitting, BP Cuff Size: Adult)   Pulse 80   Temp 36.5 °C (97.7 °F) (Temporal)   Ht 1.702 m (5' 7\")   Wt 76.1 kg (167 lb 12.8 oz)   BMI 26.28 kg/m²      PREVIOUS WEIGHTS:  Wt Readings from Last 3 Encounters:   06/24/24 76.1 kg (167 lb 12.8 oz)   05/31/24 75.8 kg (167 lb 3.2 oz)   03/04/24 74.4 kg (164 lb)         RESULTS/DATA     Bicarbonate (mmol/L)   Date Value   11/17/2023 30   11/23/2022 30   09/09/2021 28   10/01/2020 29     Iron (ug/dL)   Date Value   05/31/2024 134     % Saturation (%)   Date Value   05/31/2024 32     TIBC (ug/dL)   Date Value   05/31/2024 418     Ferritin (ng/mL)   Date Value   05/31/2024 55       PAP Adherence  Not applicable    ASSESSMENT/PLAN     Ms. Dickerson is a 69 y.o. female and she returns in followup to the [facility] for possible sleep disordered breathing, Insomnia, and RLS.    Problem List, Orders, Assessment, Recommendations:  Problem List Items Addressed This Visit             ICD-10-CM    Benign essential hypertension I10     BP Readings from Last 1 Encounters:   06/24/24 119/69     - doing well, asymptomatic  - discussed at length the impact of untreated PHILLY and BP control  - continue current management and follow-up with PCP          Other insomnia G47.09     Likely due to change of daily routine and social interaction 2/2 COVID pandemic  Exacerbated by performance anxiety of wanting to sleep and to make sleep happen  Aggravating factors including " "lack of daily structure and inadequate sleep hygiene  We will start with avoiding naps (gael. Falling asleep in front of the TV in the evening), not reading in bed, \"let sleep come to her\"  Melatonin can be taken with dinner    - after practicing better sleep hygiene and taking melatonin at a different time, now her sleep onset problem is pretty much resolved.  She goes to sleep around MN and falls asleep generally within 30 min.  She wakes around 8:30 am and feels fine.         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     Possible sleep wake cycle disruption at this time  Our primary focus for now will be gaining some sleep back, rebuilding confidence for sleep, then we will worry about adjusting the biological clock as our next step    - doesn't seem to be a problem at this time, and we may need to address it in the future should she wants to sleep/wake at a certain time         RLS (restless legs syndrome) G25.81     Ferritin at 55  Will start iron supplement         Relevant Medications    ferrous sulfate 325 (65 Fe) MG EC tablet    Sleep apnea - Primary G47.30     - we will start work-up with Home sleep apnea test (HSAT)  - lengthy discussion on PHILLY and sleep study education as well as the tips to be successful with the sleep study  - patient voiced understanding           Relevant Orders    Home sleep apnea test (HSAT)       Disposition    Return to clinic in 6 months but will call with HSAT results first           "

## 2024-06-25 ENCOUNTER — APPOINTMENT (OUTPATIENT)
Dept: PRIMARY CARE | Facility: CLINIC | Age: 69
End: 2024-06-25
Payer: MEDICARE

## 2024-07-01 ENCOUNTER — CLINICAL SUPPORT (OUTPATIENT)
Dept: SLEEP MEDICINE | Facility: HOSPITAL | Age: 69
End: 2024-07-01
Payer: MEDICARE

## 2024-07-01 DIAGNOSIS — G47.33 OBSTRUCTIVE SLEEP APNEA (ADULT) (PEDIATRIC): ICD-10-CM

## 2024-07-01 DIAGNOSIS — G47.30 SLEEP APNEA, UNSPECIFIED TYPE: ICD-10-CM

## 2024-07-01 PROCEDURE — 95806 SLEEP STUDY UNATT&RESP EFFT: CPT | Performed by: STUDENT IN AN ORGANIZED HEALTH CARE EDUCATION/TRAINING PROGRAM

## 2024-07-01 NOTE — PROGRESS NOTES
Type of Study: HOME SLEEP STUDY - NOMAD     The patient received equipment and instructions for use of the [facility] Nomad HSAT 4047 device. The patient was instructed how to apply the effort belts, cannula, thermistor. It was also explained how the Nomad and oximeter components work.  The patient was asked to record their sleep for an 8-hour period.     The patient was informed of their responsibility for the device and acknowledged this by signing the HSAT device contract. The patient was asked to return the device on 7/2/2024 between the hours of 9am to the Sleep Center.     The patient was instructed to call 911 as usual for any medical- emergencies while at home.  The patient was also given a phone number for troubleshooting when using the device in case there were additional questions.

## 2024-07-05 ENCOUNTER — TELEMEDICINE (OUTPATIENT)
Dept: PRIMARY CARE | Facility: CLINIC | Age: 69
End: 2024-07-05
Payer: MEDICARE

## 2024-07-05 VITALS — TEMPERATURE: 98.7 F | OXYGEN SATURATION: 99 %

## 2024-07-05 DIAGNOSIS — I77.9 CAROTID ARTERY DISEASE, UNSPECIFIED LATERALITY, UNSPECIFIED TYPE (CMS-HCC): ICD-10-CM

## 2024-07-05 DIAGNOSIS — R09.81 NASAL CONGESTION: ICD-10-CM

## 2024-07-05 DIAGNOSIS — J98.8 RESPIRATORY TRACT INFECTION DUE TO COVID-19 VIRUS: Primary | ICD-10-CM

## 2024-07-05 DIAGNOSIS — I48.91 ATRIAL FIBRILLATION, NEW ONSET (MULTI): ICD-10-CM

## 2024-07-05 DIAGNOSIS — U07.1 RESPIRATORY TRACT INFECTION DUE TO COVID-19 VIRUS: Primary | ICD-10-CM

## 2024-07-05 DIAGNOSIS — M35.00 SJOGREN'S SYNDROME, WITH UNSPECIFIED ORGAN INVOLVEMENT (MULTI): ICD-10-CM

## 2024-07-05 PROBLEM — M65.4 DE QUERVAIN'S TENOSYNOVITIS, LEFT: Status: RESOLVED | Noted: 2023-11-15 | Resolved: 2024-07-05

## 2024-07-05 PROCEDURE — 1160F RVW MEDS BY RX/DR IN RCRD: CPT | Performed by: INTERNAL MEDICINE

## 2024-07-05 PROCEDURE — 99213 OFFICE O/P EST LOW 20 MIN: CPT | Performed by: INTERNAL MEDICINE

## 2024-07-05 PROCEDURE — 1036F TOBACCO NON-USER: CPT | Performed by: INTERNAL MEDICINE

## 2024-07-05 PROCEDURE — 1159F MED LIST DOCD IN RCRD: CPT | Performed by: INTERNAL MEDICINE

## 2024-07-05 RX ORDER — FLUTICASONE PROPIONATE 50 MCG
1 SPRAY, SUSPENSION (ML) NASAL DAILY
Qty: 16 G | Refills: 0 | Status: SHIPPED | OUTPATIENT
Start: 2024-07-05 | End: 2025-07-05

## 2024-07-05 ASSESSMENT — ENCOUNTER SYMPTOMS
FEVER: 1
CARDIOVASCULAR NEGATIVE: 1
COUGH: 1
FATIGUE: 1
GASTROINTESTINAL NEGATIVE: 1
WHEEZING: 0
SHORTNESS OF BREATH: 0
CHEST TIGHTNESS: 0

## 2024-07-05 ASSESSMENT — PATIENT HEALTH QUESTIONNAIRE - PHQ9
SUM OF ALL RESPONSES TO PHQ9 QUESTIONS 1 AND 2: 0
2. FEELING DOWN, DEPRESSED OR HOPELESS: NOT AT ALL
1. LITTLE INTEREST OR PLEASURE IN DOING THINGS: NOT AT ALL

## 2024-07-05 NOTE — ASSESSMENT & PLAN NOTE
Rest,push fluid,monitor pulse ox closely,go to ER if below 90 % or if short of breath.  Follow CDC recommendation regarding guidelines isolation and recommendation.  Call if not better.  Add Flonase nasal spray for nasal congestion as needed.  Can Tylenol for pain and fever and Mucinex DM for cough as needed.  Patient is at high risk for COVID infection due to age and medical condition, will treat with Lagevrio given her medication list and interaction with Paxlovid if it would have been prescribed.

## 2024-07-05 NOTE — PROGRESS NOTES
Subjective   Patient ID: Sherice Dickerson is a 69 y.o. female who presents for COVID positive  (Patient tested positive for COVID Wednesday morning. Patient complains of a fever, body aches and pains, fatigue, and a feeling of malaise. Patient said that her symptoms started on Tuesday. ).    An interactive audio and video telecommunication system with patient real time communications between the patient (at the original site) and provider (at the distant site) was utilized to provide this telehealth service.  Verbal consent was requested and obtained from the patient at this date for a telehealth.  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  Her pulse ox has been around 99% on room air         Review of Systems   Constitutional:  Positive for fatigue and fever.   HENT:  Positive for congestion.    Respiratory:  Positive for cough. Negative for chest tightness, shortness of breath and wheezing.    Cardiovascular: Negative.    Gastrointestinal: Negative.        Objective   Temp 37.1 °C (98.7 °F) (Temporal)   SpO2 99%     Physical Exam  Constitutional:       General: She is not in acute distress.  Pulmonary:      Effort: No respiratory distress.   Neurological:      Mental Status: She is alert.         Assessment/Plan   Problem List Items Addressed This Visit             ICD-10-CM    Atrial fibrillation, new onset (Multi) I48.91     On Xarelto.         Carotid artery disease (CMS-HCC) I77.9     On statin, denies any symptoms.         Sjogren's syndrome (Multi) M35.00     Stable.         Respiratory tract infection due to COVID-19 virus - Primary U07.1, J98.8     Rest,push  fluid,monitor pulse ox closely,go to ER if below 90 % or if short of breath.  Follow CDC recommendation regarding guidelines isolation and recommendation.  Call if not better.  Add Flonase nasal spray for nasal congestion as needed.  Can Tylenol for pain and fever and Mucinex DM for cough as needed.  Patient is at high risk for COVID infection due to age and medical condition, will treat with Lagevrio given her medication list and interaction with Paxlovid if it would have been prescribed.         Relevant Medications    molnupiravir (Lagevrio) capsule capsule    Nasal congestion R09.81    Relevant Medications    fluticasone (Flonase) 50 mcg/actuation nasal spray

## 2024-07-10 ENCOUNTER — TELEPHONE (OUTPATIENT)
Dept: SLEEP MEDICINE | Facility: HOSPITAL | Age: 69
End: 2024-07-10
Payer: MEDICARE

## 2024-07-10 NOTE — TELEPHONE ENCOUNTER
----- Message from Joshua Silver sent at 7/3/2024  8:53 AM EDT -----  Royer Gordon,    Let's start this patient on PAP    Please emphasize to patient that if there are issues with getting acclimated to PAP therapy, let us know    Thank you      Joshua

## 2024-07-10 NOTE — TELEPHONE ENCOUNTER
Called patient regarding the sleep study result. Patient hesistant to start CPAP. Patient states that she would like to think about it and discuss alternatives with Dr. Silver Patient scheduled for a follow-up appointment on 9/9/2024. Patient verbalized understanding, all questions answered.

## 2024-09-09 ENCOUNTER — APPOINTMENT (OUTPATIENT)
Dept: SLEEP MEDICINE | Facility: CLINIC | Age: 69
End: 2024-09-09
Payer: MEDICARE

## 2024-09-09 VITALS
HEIGHT: 67 IN | WEIGHT: 169 LBS | DIASTOLIC BLOOD PRESSURE: 80 MMHG | HEART RATE: 60 BPM | BODY MASS INDEX: 26.53 KG/M2 | TEMPERATURE: 96.7 F | SYSTOLIC BLOOD PRESSURE: 136 MMHG

## 2024-09-09 DIAGNOSIS — G47.33 OSA (OBSTRUCTIVE SLEEP APNEA): ICD-10-CM

## 2024-09-09 DIAGNOSIS — I48.91 ATRIAL FIBRILLATION, NEW ONSET (MULTI): ICD-10-CM

## 2024-09-09 DIAGNOSIS — G47.09 OTHER INSOMNIA: ICD-10-CM

## 2024-09-09 DIAGNOSIS — Z72.821 INADEQUATE SLEEP HYGIENE: ICD-10-CM

## 2024-09-09 DIAGNOSIS — I10 BENIGN ESSENTIAL HYPERTENSION: ICD-10-CM

## 2024-09-09 DIAGNOSIS — G25.81 RLS (RESTLESS LEGS SYNDROME): Primary | ICD-10-CM

## 2024-09-09 PROCEDURE — 3008F BODY MASS INDEX DOCD: CPT | Performed by: STUDENT IN AN ORGANIZED HEALTH CARE EDUCATION/TRAINING PROGRAM

## 2024-09-09 PROCEDURE — 1159F MED LIST DOCD IN RCRD: CPT | Performed by: STUDENT IN AN ORGANIZED HEALTH CARE EDUCATION/TRAINING PROGRAM

## 2024-09-09 PROCEDURE — 99214 OFFICE O/P EST MOD 30 MIN: CPT | Performed by: STUDENT IN AN ORGANIZED HEALTH CARE EDUCATION/TRAINING PROGRAM

## 2024-09-09 PROCEDURE — 3079F DIAST BP 80-89 MM HG: CPT | Performed by: STUDENT IN AN ORGANIZED HEALTH CARE EDUCATION/TRAINING PROGRAM

## 2024-09-09 PROCEDURE — 3075F SYST BP GE 130 - 139MM HG: CPT | Performed by: STUDENT IN AN ORGANIZED HEALTH CARE EDUCATION/TRAINING PROGRAM

## 2024-09-09 PROCEDURE — 1160F RVW MEDS BY RX/DR IN RCRD: CPT | Performed by: STUDENT IN AN ORGANIZED HEALTH CARE EDUCATION/TRAINING PROGRAM

## 2024-09-09 PROCEDURE — 1036F TOBACCO NON-USER: CPT | Performed by: STUDENT IN AN ORGANIZED HEALTH CARE EDUCATION/TRAINING PROGRAM

## 2024-09-09 PROCEDURE — G2211 COMPLEX E/M VISIT ADD ON: HCPCS | Performed by: STUDENT IN AN ORGANIZED HEALTH CARE EDUCATION/TRAINING PROGRAM

## 2024-09-09 ASSESSMENT — SLEEP AND FATIGUE QUESTIONNAIRES
WORRIED_DISTRESSED_DUE_TO_SLEEP: A LITTLE
HOW LIKELY ARE YOU TO NOD OFF OR FALL ASLEEP WHEN YOU ARE A PASSENGER IN A CAR FOR AN HOUR WITHOUT A BREAK: SLIGHT CHANCE OF DOZING
DIFFICULTY_STAYING_ASLEEP: MODERATE
SITING INACTIVE IN A PUBLIC PLACE LIKE A CLASS ROOM OR A MOVIE THEATER: SLIGHT CHANCE OF DOZING
HOW LIKELY ARE YOU TO NOD OFF OR FALL ASLEEP WHILE SITTING AND TALKING TO SOMEONE: WOULD NEVER DOZE
HOW LIKELY ARE YOU TO NOD OFF OR FALL ASLEEP IN A CAR, WHILE STOPPED FOR A FEW MINUTES IN TRAFFIC: WOULD NEVER DOZE
SATISFACTION_WITH_CURRENT_SLEEP_PATTERN: SATISFIED
HOW LIKELY ARE YOU TO NOD OFF OR FALL ASLEEP WHILE WATCHING TV: MODERATE CHANCE OF DOZING
WAKING_TOO_EARLY: MODERATE
SLEEP_PROBLEM_NOTICEABLE_TO_OTHERS: SOMEWHAT
SLEEP_PROBLEM_INTERFERES_DAILY_ACTIVITIES: A LITTLE
DIFFICULTY_FALLING_ASLEEP: MILD
ESS-CHAD TOTAL SCORE: 7
HOW LIKELY ARE YOU TO NOD OFF OR FALL ASLEEP WHILE LYING DOWN TO REST IN THE AFTERNOON WHEN CIRCUMSTANCES PERMIT: SLIGHT CHANCE OF DOZING
HOW LIKELY ARE YOU TO NOD OFF OR FALL ASLEEP WHILE SITTING AND READING: MODERATE CHANCE OF DOZING
HOW LIKELY ARE YOU TO NOD OFF OR FALL ASLEEP WHILE SITTING QUIETLY AFTER LUNCH WITHOUT ALCOHOL: WOULD NEVER DOZE

## 2024-09-09 ASSESSMENT — ENCOUNTER SYMPTOMS
NEUROLOGICAL NEGATIVE: 1
PSYCHIATRIC NEGATIVE: 1
CARDIOVASCULAR NEGATIVE: 1
CONSTITUTIONAL NEGATIVE: 1
RESPIRATORY NEGATIVE: 1

## 2024-09-09 NOTE — ASSESSMENT & PLAN NOTE
We reviewed the results from her HSAT (AHI ~ 17) in detail and explored treatment options  At least time, I think watchful waiting is a reasonable approach.  She may want to explore a bit more on positional therapy.  She is not all that symptomatic per se.    She does have paroxysmal a-fib with no immediate plan for cardioversion or ablation

## 2024-09-09 NOTE — PROGRESS NOTES
" Patient: Sherice Dickerson    [id number]  : 1955 -- AGE 69 y.o.    Provider: Joshua Silver MD     Location [facility]   Service Date: 2024              [facility]  Followup Visit Note    HISTORY OF PRESENT ILLNESS     HISTORY OF PRESENT ILLNESS   Sherice Dickerson is a 69 y.o. female with h/o Hypertension, PHILLY, Insomnia, and a-fib  who presents to a [facility] for followup.     Assessment and plan from last visit: 2024    Ms. Dickerson is a 69 y.o. female and she returns in followup to the [facility] for possible sleep disordered breathing, Insomnia, and RLS.     Problem List, Orders, Assessment, Recommendations:  Problem List Items Addressed This Visit               ICD-10-CM     Benign essential hypertension I10           BP Readings from Last 1 Encounters:   24 119/69      - doing well, asymptomatic  - discussed at length the impact of untreated PHILLY and BP control  - continue current management and follow-up with PCP            Other insomnia G47.09       Likely due to change of daily routine and social interaction 2/2 COVID pandemic  Exacerbated by performance anxiety of wanting to sleep and to make sleep happen  Aggravating factors including lack of daily structure and inadequate sleep hygiene  We will start with avoiding naps (gael. Falling asleep in front of the TV in the evening), not reading in bed, \"let sleep come to her\"  Melatonin can be taken with dinner    - after practicing better sleep hygiene and taking melatonin at a different time, now her sleep onset problem is pretty much resolved.  She goes to sleep around MN and falls asleep generally within 30 min.  She wakes around 8:30 am and feels fine.           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       Possible sleep wake cycle disruption at this time  Our primary focus for now will be gaining some sleep back, rebuilding confidence for sleep, then we will worry about adjusting the biological clock as our next step    - doesn't seem to be a problem at this time, and we may need to address it in the future should she wants to sleep/wake at a certain time           RLS (restless legs syndrome) G25.81       Ferritin at 55  Will start iron supplement           Relevant Medications     ferrous sulfate 325 (65 Fe) MG EC tablet     Sleep apnea - Primary G47.30       - we will start work-up with Home sleep apnea test (HSAT)  - lengthy discussion on PHILLY and sleep study education as well as the tips to be successful with the sleep study  - patient voiced understanding              Relevant Orders     Home sleep apnea test (HSAT)         Disposition     Return to clinic in 6 months but will call with HSAT results first    Current History    On today's visit, the patient reports that she has been doing well.  Some OSAS symptoms but not terrible.  She has some issues taking iron due to constipation (she also has IBS), but chelated iron seemed to be ok.  She is finding some improvement.  She continues to do well with her better sleep hygiene.  She is a bit worried about Jan-March as she was having a lot of issues with that in the last couple of years since COVID (difficulty sleeping during those months)    RLS Followup:   See above    Daytime Symptoms    Patient reports DAYTIME SYMPTOMS: no daytime symptoms  Patient denies daytime symptoms including: Denies: excessive daytime sleepiness    Naps: No  Fatigue: denies feeling fatigue    ESS: 7  MARTA: 10  FOSQ: 35    REVIEW OF SYSTEMS     REVIEW OF SYSTEMS  Review of Systems   Constitutional: Negative.    HENT: Negative.     Respiratory: Negative.     Cardiovascular: Negative.    Genitourinary:  "Negative.    Skin: Negative.    Neurological: Negative.    Psychiatric/Behavioral: Negative.           ALLERGIES AND MEDICATIONS     ALLERGIES  No Known Allergies    MEDICATIONS: She has a current medication list which includes the following prescription(s): atorvastatin - TAKE 1 TABLET DAILY, cholecalciferol - Take 1 tablet (1,000 Units) by mouth once daily, ferrous sulfate - Do not crush, chew, or split, levothyroxine - Take 1 tablet (88 mcg) by mouth once daily in the morning. Take before meals, lisinopril - TAKE 1 TABLET DAILY, xarelto - TAKE 1 TABLET DAILY, estradiol - Insert 1 Applicatorful (4 g) into the vagina 3 (three) times a week, and fluticasone - Administer 1 spray into each nostril once daily. Shake gently. Before first use, prime pump. After use, clean tip and replace cap.    PAST MEDICAL HISTORY : She  has a past medical history of Elevated blood-pressure reading, without diagnosis of hypertension (07/06/2020) and Nontoxic single thyroid nodule (12/27/2018).    PAST SURGICAL HISTORY: She  has a past surgical history that includes Tonsillectomy (06/05/2014); Colonoscopy (09/21/2016); and Total thyroidectomy (06/12/2018).     FAMILY HISTORY: No changes since previous visit. Otherwise non-contributory as charted.     SOCIAL HISTORY  She  reports that she has never smoked. She has never used smokeless tobacco. She reports that she does not currently use alcohol. She reports that she does not use drugs.       PHYSICAL EXAM     VITAL SIGNS: /80 (BP Location: Right arm, Patient Position: Sitting, BP Cuff Size: Adult)   Pulse 60   Temp 35.9 °C (96.7 °F) (Temporal)   Ht 1.702 m (5' 7\")   Wt 76.7 kg (169 lb)   BMI 26.47 kg/m²      PREVIOUS WEIGHTS:  Wt Readings from Last 3 Encounters:   09/09/24 76.7 kg (169 lb)   06/24/24 75.7 kg (166 lb 14.2 oz)   06/24/24 76.1 kg (167 lb 12.8 oz)         RESULTS/DATA     Bicarbonate (mmol/L)   Date Value   11/17/2023 30   11/23/2022 30   09/09/2021 28 " "  10/01/2020 29     Iron (ug/dL)   Date Value   05/31/2024 134     % Saturation (%)   Date Value   05/31/2024 32     TIBC (ug/dL)   Date Value   05/31/2024 418     Ferritin (ng/mL)   Date Value   05/31/2024 55       PAP Adherence  Not applicable    ASSESSMENT/PLAN     Ms. Dickerson is a 69 y.o. female and she returns in followup to the [facility] for PHILLY, Insomnia, and RLS.    Problem List, Orders, Assessment, Recommendations:  Problem List Items Addressed This Visit             ICD-10-CM    Atrial fibrillation, new onset (Multi) I48.91    Benign essential hypertension I10     BP Readings from Last 1 Encounters:   09/09/24 136/80     - doing well, asymptomatic  - discussed at length the impact of untreated PHILLY and BP control  - continue current management and follow-up with PCP            Other insomnia G47.09     Likely due to change of daily routine and social interaction 2/2 COVID pandemic  Exacerbated by performance anxiety of wanting to sleep and to make sleep happen  Aggravating factors including lack of daily structure and inadequate sleep hygiene  We will start with avoiding naps (gael. Falling asleep in front of the TV in the evening), not reading in bed, \"let sleep come to her\"  Melatonin can be taken with dinner    - after practicing better sleep hygiene and taking melatonin at a different time, now her sleep onset problem is pretty much resolved.  She goes to sleep around MN and falls asleep generally within 30 min.  She wakes around 8:30 am and feels fine.         Inadequate sleep hygiene Z72.821     Start with stimulus control and reserving bed for sleep only -> doing well and she knows the right thing to do and will continue to practice good sleep hygiene         RLS (restless legs syndrome) - Primary G25.81     Ferritin at 55  Cannot take iron supplement, but chelated iron seemed to be ok (IBS and constipation issue)  We will recheck Ferritin at follow-up, depending on " symptom improvement, we will explore meds vs IV iron         Relevant Orders    Follow Up In Adult Sleep Medicine    PHILLY (obstructive sleep apnea) G47.33     We reviewed the results from her HSAT (AHI ~ 17) in detail and explored treatment options  At least time, I think watchful waiting is a reasonable approach.  She may want to explore a bit more on positional therapy.  She is not all that symptomatic per se.    She does have paroxysmal a-fib with no immediate plan for cardioversion or ablation            Disposition    Return to clinic in 4-5 months

## 2024-09-09 NOTE — ASSESSMENT & PLAN NOTE
BP Readings from Last 1 Encounters:   09/09/24 136/80     - doing well, asymptomatic  - discussed at length the impact of untreated PHILLY and BP control  - continue current management and follow-up with PCP

## 2024-09-09 NOTE — ASSESSMENT & PLAN NOTE
Ferritin at 55  Cannot take iron supplement, but chelated iron seemed to be ok (IBS and constipation issue)  We will recheck Ferritin at follow-up, depending on symptom improvement, we will explore meds vs IV iron

## 2024-11-06 ENCOUNTER — TELEMEDICINE (OUTPATIENT)
Dept: PRIMARY CARE | Facility: CLINIC | Age: 69
End: 2024-11-06
Payer: MEDICARE

## 2024-11-06 ENCOUNTER — TELEPHONE (OUTPATIENT)
Dept: PRIMARY CARE | Facility: CLINIC | Age: 69
End: 2024-11-06

## 2024-11-06 DIAGNOSIS — S80.862A TICK BITE OF LEFT LOWER LEG, INITIAL ENCOUNTER: Primary | ICD-10-CM
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
## 2024-11-06 PROCEDURE — 1159F MED LIST DOCD IN RCRD: CPT | Performed by: INTERNAL MEDICINE

## 2024-11-06 PROCEDURE — 1160F RVW MEDS BY RX/DR IN RCRD: CPT | Performed by: INTERNAL MEDICINE

## 2024-11-06 PROCEDURE — 99212 OFFICE O/P EST SF 10 MIN: CPT | Performed by: INTERNAL MEDICINE

## 2024-11-06 PROCEDURE — 1036F TOBACCO NON-USER: CPT | Performed by: INTERNAL MEDICINE

## 2024-11-06 RX ORDER — AMOXICILLIN 875 MG/1
875 TABLET, FILM COATED ORAL 2 TIMES DAILY
Qty: 20 TABLET | Refills: 0 | Status: SHIPPED | OUTPATIENT
Start: 2024-11-06 | End: 2024-11-16

## 2024-11-06 ASSESSMENT — ENCOUNTER SYMPTOMS: ROS SKIN COMMENTS: AS HPI.

## 2024-11-06 NOTE — TELEPHONE ENCOUNTER
"Patient advised she pulled a tick out of her left knee yesterday today she noticed a \"bullseye red [place]\" around area.     Advised Urgent Care     Patient asking if she can avoid urgent care and send photo through [employer]    Please advise    [phone]  "

## 2024-11-06 NOTE — PROGRESS NOTES
Subjective   Patient ID: Sherice Dickerson is a 69 y.o. female who presents for Insect Bite (Patient is here for a tick bite. Patient was walking in the park and the tick crawled up her pants. ).    An interactive audio and video telecommunication system with patient real time communications between the patient (at the original site) and provider (at the distant site) was utilized to provide this telehealth service.  Verbal consent was requested and obtained from the patient at this date for a telehealth.  Patient seen virtually because yesterday at night when she went to take a shower she noticed an attached tick on her left lower leg she tried to pull it out like small pieces but then noted redness and swelling around the site of the bite, currently and fall, no drainage, she denies any fever or chills, she has been applying topical antibiotic, she was walking all day yesterday and the wounds wearing long pants and taking photos.         Review of Systems   Skin:         As HPI.       Objective   There were no vitals taken for this visit.    Physical Exam  Constitutional:       General: She is not in acute distress.  Pulmonary:      Effort: No respiratory distress.   Skin:     Comments: Left leg: Noted raised erythematous skin centered by a large ulcer from recent tick bite, it is painful to palpation.   Neurological:      Mental Status: She is alert.         Assessment/Plan   Problem List Items Addressed This Visit             ICD-10-CM    Tick bite of left lower leg - Primary S80.862A, W57.XXXA     Will treat with amoxicillin for 10 days.  Monitor for any systemic symptoms such headache fever chills or any other neurologic complaint.         Relevant Medications    amoxicillin (Amoxil) 875 mg tablet

## 2024-11-06 NOTE — ASSESSMENT & PLAN NOTE
Will treat with amoxicillin for 10 days.  Monitor for any systemic symptoms such headache fever chills or any other neurologic complaint.

## 2024-11-15 ENCOUNTER — APPOINTMENT (OUTPATIENT)
Dept: PRIMARY CARE | Facility: CLINIC | Age: 69
End: 2024-11-15
Payer: MEDICARE

## 2024-12-23 ENCOUNTER — APPOINTMENT (OUTPATIENT)
Dept: SLEEP MEDICINE | Facility: CLINIC | Age: 69
End: 2024-12-23
Payer: MEDICARE

## 2024-12-23 DIAGNOSIS — I10 BENIGN ESSENTIAL HYPERTENSION: ICD-10-CM

## 2024-12-23 RX ORDER — LISINOPRIL 5 MG/1
5 TABLET ORAL DAILY
Qty: 90 TABLET | Refills: 0 | Status: SHIPPED | OUTPATIENT
Start: 2024-12-23

## 2025-01-02 NOTE — PROGRESS NOTES
FOLLOW-UP VISIT       HISTORY OF PRESENT ILLNESS:   Sherice Dickerson is a 69 y.o. female who presents to me today for follow-up of GSM and her annual well woman exam.     Patient continues to use vaginal estradiol cream for GSM management which works well for her. Reports worsening stress incontinence, IBS, and constipation. Interested in starting PFPT.      Patient's last pap was on 12/2/21 which demonstrated no evidence of lesion or malignancy. Paps are no longer indicated due to the patient having no history of previous abnormal paps through age 65.     Patient's last mammogram was on 1/5/24 which demonstrated no evidence of malignancy.     PAST MEDICAL HISTORY:  Past Medical History:   Diagnosis Date    Elevated blood-pressure reading, without diagnosis of hypertension 07/06/2020    Elevated blood pressure reading    Nontoxic single thyroid nodule 12/27/2018    Right thyroid nodule       PAST SURGICAL HISTORY:  Past Surgical History:   Procedure Laterality Date    COLONOSCOPY  09/21/2016    Complete Colonoscopy    TONSILLECTOMY  06/05/2014    Tonsillectomy    TOTAL THYROIDECTOMY  06/12/2018    Thyroid Surgery Total Thyroidectomy        ALLERGIES:   No Known Allergies     MEDICATIONS:   Medication Documentation Review Audit       Reviewed by Caprice Perez MA (Medical Assistant) on 01/03/25 at 0956      Medication Order Taking? Sig Documenting Provider Last Dose Status   atorvastatin (Lipitor) 20 mg tablet [id number] Yes TAKE 1 TABLET DAILY Shannon Ojeda MD Taking Active   cholecalciferol (Vitamin D-3) 25 MCG (1000 UT) tablet [id number] Yes Take 1 tablet (1,000 Units) by mouth once daily. Historical Provider, MD Taking Active   estradiol (Estrace) 0.01 % (0.1 mg/gram) vaginal cream [id number] Yes Insert 1 Applicatorful (4 g) into the vagina 3 (three) times a week. Historical Provider, MD Taking Active   ferrous sulfate 325 (65 Fe) MG EC tablet [id number] Yes Do not crush, chew, or split. Joshua Silver MD Taking Active    levothyroxine (Synthroid, Levoxyl) 88 mcg tablet [id number] Yes Take 1 tablet (88 mcg) by mouth once daily in the morning. Take before meals. Historical Provider, MD Taking Active   lisinopril 5 mg tablet [id number] Yes TAKE 1 TABLET DAILY Emile Murry,   Active   Xarelto 20 mg tablet [id number] Yes TAKE 1 TABLET DAILY Partha Murry MD Taking Active                     SOCIAL HISTORY:  Patient  reports that she has never smoked. She has never used smokeless tobacco. She reports that she does not currently use alcohol. She reports that she does not use drugs.   Social History     Socioeconomic History    Marital status:      Spouse name: Not on file    Number of children: Not on file    Years of education: Not on file    Highest education level: Not on file   Occupational History    Not on file   Tobacco Use    Smoking status: Never    Smokeless tobacco: Never   Substance and Sexual Activity    Alcohol use: Not Currently     Comment: socially    Drug use: Never    Sexual activity: Not on file   Other Topics Concern    Not on file   Social History Narrative    Not on file     Social Drivers of Health     Financial Resource Strain: Not on file   Food Insecurity: Not on file   Transportation Needs: Not on file   Physical Activity: Not on file   Stress: Not on file   Social Connections: Not on file   Intimate Partner Violence: Not on file   Housing Stability: Not on file       FAMILY HISTORY:  Family History   Problem Relation Name Age of Onset    Breast cancer Mother's Sister         REVIEW OF SYSTEMS:  Constitutional: Negative for fever and chills. Denies anorexia, weight loss.  Eyes: Negative for visual disturbance.   Respiratory: Negative for shortness of breath.    Cardiovascular: Negative for chest pain.   Gastrointestinal: Negative for nausea and vomiting.   Genitourinary: See interval history above.  Skin: Negative for rash.   Neurological: Negative for dizziness and numbness.    Psychiatric/Behavioral: Negative for confusion and decreased concentration.     PHYSICAL EXAM:  Blood pressure (!) 157/93, pulse 60, temperature 36.3 °C (97.4 °F).  Constitutional: Patient appears well-developed and well-nourished. No distress.    Head: Normocephalic and atraumatic.    Neck: Normal range of motion.    Cardiovascular: Normal rate.    Pulmonary/Chest: Effort normal. No respiratory distress.   Abdominal: No masses bilaterally in lower abdomen.   : Tissues are atrophic but non tender.   Musculoskeletal: Normal range of motion.    Neurological: Alert and oriented to person, place, and time.  Psychiatric: Normal mood and affect. Behavior is normal. Thought content normal.       Assessment:      No diagnosis found.    Sherice Dickerson is a 69 y.o. female with GSM and worsening stress incontinence presenting for her annual exam.     I generally recommend patients attend a few sessions with a specialized physical therapist who has specific knowledge in the treatment of the pelvic floor muscles. Studies looking at pelvic floor physical therapy for treatment of stress incontinence have found that more intensive, supervised programs show better results for symptoms. After working with a pelvic floor PT, patients can then continue to do the exercises on their own.      Plan:   Referral to PFPT.  Ordered mammogram.   Prescription for estradiol 0.01% vaginal cream sent to the patient's pharmacy.    Follow-up in 1 year.       Princess Ray MD MPH      Scribe Attestation  By signing my name below, Josseline REEDER Scribthomas   attest that this documentation has been prepared under the direction and in the presence of Princess Ray MD MPH.

## 2025-01-03 ENCOUNTER — APPOINTMENT (OUTPATIENT)
Dept: UROLOGY | Facility: CLINIC | Age: 70
End: 2025-01-03
Payer: MEDICARE

## 2025-01-03 VITALS — TEMPERATURE: 97.4 F | HEART RATE: 60 BPM | DIASTOLIC BLOOD PRESSURE: 93 MMHG | SYSTOLIC BLOOD PRESSURE: 157 MMHG

## 2025-01-03 DIAGNOSIS — Z12.31 OTHER SCREENING MAMMOGRAM: ICD-10-CM

## 2025-01-03 DIAGNOSIS — N39.3 SUI (STRESS URINARY INCONTINENCE, FEMALE): ICD-10-CM

## 2025-01-03 DIAGNOSIS — N95.8 GENITOURINARY SYNDROME OF MENOPAUSE: ICD-10-CM

## 2025-01-03 DIAGNOSIS — Z01.419 WOMEN'S ANNUAL ROUTINE GYNECOLOGICAL EXAMINATION: ICD-10-CM

## 2025-01-03 PROCEDURE — 3080F DIAST BP >= 90 MM HG: CPT | Performed by: OBSTETRICS & GYNECOLOGY

## 2025-01-03 PROCEDURE — 3077F SYST BP >= 140 MM HG: CPT | Performed by: OBSTETRICS & GYNECOLOGY

## 2025-01-03 PROCEDURE — 99213 OFFICE O/P EST LOW 20 MIN: CPT | Performed by: OBSTETRICS & GYNECOLOGY

## 2025-01-03 PROCEDURE — G2211 COMPLEX E/M VISIT ADD ON: HCPCS | Performed by: OBSTETRICS & GYNECOLOGY

## 2025-01-03 PROCEDURE — 1159F MED LIST DOCD IN RCRD: CPT | Performed by: OBSTETRICS & GYNECOLOGY

## 2025-01-03 RX ORDER — ESTRADIOL 0.1 MG/G
CREAM VAGINAL
Qty: 42.5 G | Refills: 3 | Status: SHIPPED | OUTPATIENT
Start: 2025-01-03

## 2025-01-06 ENCOUNTER — APPOINTMENT (OUTPATIENT)
Dept: PRIMARY CARE | Facility: CLINIC | Age: 70
End: 2025-01-06
Payer: MEDICARE

## 2025-01-06 VITALS
DIASTOLIC BLOOD PRESSURE: 76 MMHG | WEIGHT: 171.8 LBS | BODY MASS INDEX: 26.97 KG/M2 | OXYGEN SATURATION: 96 % | TEMPERATURE: 97.9 F | HEIGHT: 67 IN | HEART RATE: 63 BPM | RESPIRATION RATE: 18 BRPM | SYSTOLIC BLOOD PRESSURE: 114 MMHG

## 2025-01-06 DIAGNOSIS — Z00.00 ROUTINE GENERAL MEDICAL EXAMINATION AT HEALTH CARE FACILITY: ICD-10-CM

## 2025-01-06 DIAGNOSIS — Z00.00 MEDICARE ANNUAL WELLNESS VISIT, SUBSEQUENT: Primary | ICD-10-CM

## 2025-01-06 DIAGNOSIS — I10 BENIGN ESSENTIAL HYPERTENSION: ICD-10-CM

## 2025-01-06 DIAGNOSIS — C73 MALIGNANT NEOPLASM OF THYROID GLAND (MULTI): ICD-10-CM

## 2025-01-06 DIAGNOSIS — Z78.0 ASYMPTOMATIC MENOPAUSE: ICD-10-CM

## 2025-01-06 DIAGNOSIS — E78.00 HYPERCHOLESTEROLEMIA: ICD-10-CM

## 2025-01-06 DIAGNOSIS — M54.50 CHRONIC MIDLINE LOW BACK PAIN WITHOUT SCIATICA: ICD-10-CM

## 2025-01-06 DIAGNOSIS — B35.1 ONYCHOMYCOSIS: ICD-10-CM

## 2025-01-06 DIAGNOSIS — G89.29 CHRONIC MIDLINE LOW BACK PAIN WITHOUT SCIATICA: ICD-10-CM

## 2025-01-06 DIAGNOSIS — Z00.00 ANNUAL PHYSICAL EXAM: ICD-10-CM

## 2025-01-06 DIAGNOSIS — I48.91 ATRIAL FIBRILLATION, NEW ONSET (MULTI): ICD-10-CM

## 2025-01-06 PROCEDURE — 1159F MED LIST DOCD IN RCRD: CPT | Performed by: INTERNAL MEDICINE

## 2025-01-06 PROCEDURE — 1158F ADVNC CARE PLAN TLK DOCD: CPT | Performed by: INTERNAL MEDICINE

## 2025-01-06 PROCEDURE — 99214 OFFICE O/P EST MOD 30 MIN: CPT | Performed by: INTERNAL MEDICINE

## 2025-01-06 PROCEDURE — 1160F RVW MEDS BY RX/DR IN RCRD: CPT | Performed by: INTERNAL MEDICINE

## 2025-01-06 PROCEDURE — G0439 PPPS, SUBSEQ VISIT: HCPCS | Performed by: INTERNAL MEDICINE

## 2025-01-06 PROCEDURE — 1123F ACP DISCUSS/DSCN MKR DOCD: CPT | Performed by: INTERNAL MEDICINE

## 2025-01-06 PROCEDURE — 1170F FXNL STATUS ASSESSED: CPT | Performed by: INTERNAL MEDICINE

## 2025-01-06 PROCEDURE — 3074F SYST BP LT 130 MM HG: CPT | Performed by: INTERNAL MEDICINE

## 2025-01-06 PROCEDURE — 3008F BODY MASS INDEX DOCD: CPT | Performed by: INTERNAL MEDICINE

## 2025-01-06 PROCEDURE — 3078F DIAST BP <80 MM HG: CPT | Performed by: INTERNAL MEDICINE

## 2025-01-06 RX ORDER — CICLOPIROX 80 MG/ML
SOLUTION TOPICAL NIGHTLY
Qty: 6.6 ML | Refills: 2 | Status: SHIPPED | OUTPATIENT
Start: 2025-01-06 | End: 2025-04-06

## 2025-01-06 ASSESSMENT — ACTIVITIES OF DAILY LIVING (ADL)
DRESSING: INDEPENDENT
BATHING: INDEPENDENT
GROCERY_SHOPPING: INDEPENDENT
MANAGING_FINANCES: INDEPENDENT
DOING_HOUSEWORK: INDEPENDENT
TAKING_MEDICATION: INDEPENDENT

## 2025-01-06 ASSESSMENT — PATIENT HEALTH QUESTIONNAIRE - PHQ9
2. FEELING DOWN, DEPRESSED OR HOPELESS: NOT AT ALL
1. LITTLE INTEREST OR PLEASURE IN DOING THINGS: NOT AT ALL
SUM OF ALL RESPONSES TO PHQ9 QUESTIONS 1 AND 2: 0

## 2025-01-06 NOTE — ASSESSMENT & PLAN NOTE
Problem: Neurological Deficit  Goal: Neurological status is stable or improving  Description: Interventions:  - Monitor and assess patient's level of consciousness, motor function, sensory function, and level of assistance needed for ADLs.   - Monitor and report changes from baseline. Collaborate with interdisciplinary team to initiate plan and implement interventions as ordered.   - Provide and maintain a safe environment.  - Consider seizure precautions.  - Consider fall precautions.  - Consider aspiration precautions.  - Consider bleeding precautions.  Outcome: Not Progressing      Managed by cardiologistcapo

## 2025-01-06 NOTE — PROGRESS NOTES
"Subjective   Patient ID: Sherice Dickerson is a 69 y.o. female who presents for Medicare Annual Wellness Visit Subsequent and Annual Exam.    Here for MCR and to f/u on HTN and anxiety, HLD,she had h/o thyroid cancer s/p thyroidectomy 6/11/18 ,she has h/o tubular adenoma ,in past.  She has a history of stable pancreatic insufficiency .  f/u on HTN,she is tolerating Lisinopril well.  she sees robel Brown for her thyroid cancer  .her thyroid meds has been adjusted by her endon,she c/o weight gain.  she saw Dr Menon cardiologist in recent past,denies any palpitation or CP.  no dizziness.  Has infrequent A-fib.  She has history of colon adenoma will be due for colonoscopy in January.  Last colonoscopy 3/14/24,next in 5 years due to polyp  Last DEXA 12/14/2022.  Last mammogram 1/5/24             Review of Systems   Constitutional: Negative.    HENT: Negative.     Eyes: Negative.    Respiratory: Negative.     Cardiovascular: Negative.    Gastrointestinal: Negative.    Genitourinary: Negative.    Neurological: Negative.    Hematological: Negative.    Psychiatric/Behavioral: Negative.         Objective   /76   Pulse 63   Temp 36.6 °C (97.9 °F)   Resp 18   Ht 1.702 m (5' 7\")   Wt 77.9 kg (171 lb 12.8 oz)   SpO2 96%   BMI 26.91 kg/m²     Physical Exam  Constitutional:       Appearance: Normal appearance.   HENT:      Head: Normocephalic and atraumatic.   Eyes:      Extraocular Movements: Extraocular movements intact.      Pupils: Pupils are equal, round, and reactive to light.   Cardiovascular:      Rate and Rhythm: Normal rate and regular rhythm.      Heart sounds: Normal heart sounds.   Pulmonary:      Effort: Pulmonary effort is normal.      Breath sounds: Normal breath sounds. No wheezing or rhonchi.   Abdominal:      General: Abdomen is flat. Bowel sounds are normal. There is no distension.      Palpations: Abdomen is soft.   Musculoskeletal:      Cervical back: Normal range of motion and neck supple.      " Right lower leg: No edema.      Left lower leg: No edema.      Comments: Knee crepitus.   Skin:     General: Skin is warm.   Neurological:      General: No focal deficit present.      Mental Status: She is alert and oriented to person, place, and time.   Psychiatric:         Mood and Affect: Mood normal.         Behavior: Behavior normal.         Assessment/Plan   Problem List Items Addressed This Visit             ICD-10-CM    Atrial fibrillation, new onset (Multi) I48.91     Managed by cardiologist,paroxysmal         Relevant Orders    TSH with reflex to Free T4 if abnormal    Benign essential hypertension I10    Relevant Orders    CBC    Comprehensive Metabolic Panel    Hypercholesterolemia E78.00    Relevant Orders    Lipid Panel    Malignant neoplasm of thyroid gland (Multi) C73     S/p thyroidectomy,managed by Dr Beasley         Medicare annual wellness visit, subsequent - Primary Z00.00    Annual physical exam Z00.00    Asymptomatic menopause Z78.0    Relevant Orders    XR DEXA bone density     Other Visit Diagnoses         Codes    Routine general medical examination at health care facility     Z00.00    Relevant Orders    1 Year Follow Up In [facility] - PCP - Wellness Exam    Onychomycosis     B35.1    Relevant Medications    ciclopirox ([place]) 8 % solution    Chronic midline low back pain without sciatica     M54.50, G89.29    Relevant Orders    XR lumbar spine 2-3 views

## 2025-01-09 ASSESSMENT — ENCOUNTER SYMPTOMS
CONSTITUTIONAL NEGATIVE: 1
NEUROLOGICAL NEGATIVE: 1
CARDIOVASCULAR NEGATIVE: 1
HEMATOLOGIC/LYMPHATIC NEGATIVE: 1
EYES NEGATIVE: 1
PSYCHIATRIC NEGATIVE: 1
GASTROINTESTINAL NEGATIVE: 1
RESPIRATORY NEGATIVE: 1

## 2025-01-13 ENCOUNTER — HOSPITAL ENCOUNTER (OUTPATIENT)
Dept: RADIOLOGY | Facility: CLINIC | Age: 70
Discharge: HOME | End: 2025-01-13
Payer: MEDICARE

## 2025-01-13 ENCOUNTER — LAB (OUTPATIENT)
Dept: LAB | Facility: LAB | Age: 70
End: 2025-01-13
Payer: MEDICARE

## 2025-01-13 DIAGNOSIS — I48.91 ATRIAL FIBRILLATION, NEW ONSET (MULTI): ICD-10-CM

## 2025-01-13 DIAGNOSIS — I10 BENIGN ESSENTIAL HYPERTENSION: ICD-10-CM

## 2025-01-13 DIAGNOSIS — Z78.0 ASYMPTOMATIC MENOPAUSE: ICD-10-CM

## 2025-01-13 DIAGNOSIS — E78.00 HYPERCHOLESTEROLEMIA: ICD-10-CM

## 2025-01-13 LAB
ALBUMIN SERPL BCP-MCNC: 4.4 G/DL (ref 3.4–5)
ALP SERPL-CCNC: 59 U/L (ref 33–136)
ALT SERPL W P-5'-P-CCNC: 21 U/L (ref 7–45)
ANION GAP SERPL CALC-SCNC: 13 MMOL/L (ref 10–20)
AST SERPL W P-5'-P-CCNC: 20 U/L (ref 9–39)
BILIRUB SERPL-MCNC: 0.7 MG/DL (ref 0–1.2)
BUN SERPL-MCNC: 16 MG/DL (ref 6–23)
CALCIUM SERPL-MCNC: 9.6 MG/DL (ref 8.6–10.6)
CHLORIDE SERPL-SCNC: 102 MMOL/L (ref 98–107)
CHOLEST SERPL-MCNC: 264 MG/DL (ref 0–199)
CHOLESTEROL/HDL RATIO: 3
CO2 SERPL-SCNC: 29 MMOL/L (ref 21–32)
CREAT SERPL-MCNC: 0.94 MG/DL (ref 0.5–1.05)
EGFRCR SERPLBLD CKD-EPI 2021: 66 ML/MIN/1.73M*2
ERYTHROCYTE [DISTWIDTH] IN BLOOD BY AUTOMATED COUNT: 12.1 % (ref 11.5–14.5)
GLUCOSE SERPL-MCNC: 87 MG/DL (ref 74–99)
HCT VFR BLD AUTO: 41.1 % (ref 36–46)
HDLC SERPL-MCNC: 87.7 MG/DL
HGB BLD-MCNC: 13.7 G/DL (ref 12–16)
LDLC SERPL CALC-MCNC: 160 MG/DL
MCH RBC QN AUTO: 30.7 PG (ref 26–34)
MCHC RBC AUTO-ENTMCNC: 33.3 G/DL (ref 32–36)
MCV RBC AUTO: 92 FL (ref 80–100)
NON HDL CHOLESTEROL: 176 MG/DL (ref 0–149)
NRBC BLD-RTO: 0 /100 WBCS (ref 0–0)
PLATELET # BLD AUTO: 266 X10*3/UL (ref 150–450)
POTASSIUM SERPL-SCNC: 4.5 MMOL/L (ref 3.5–5.3)
PROT SERPL-MCNC: 7.6 G/DL (ref 6.4–8.2)
RBC # BLD AUTO: 4.46 X10*6/UL (ref 4–5.2)
SODIUM SERPL-SCNC: 139 MMOL/L (ref 136–145)
T4 FREE SERPL-MCNC: 1.69 NG/DL (ref 0.78–1.48)
TRIGL SERPL-MCNC: 81 MG/DL (ref 0–149)
TSH SERPL-ACNC: 7.57 MIU/L (ref 0.44–3.98)
VLDL: 16 MG/DL (ref 0–40)
WBC # BLD AUTO: 3.5 X10*3/UL (ref 4.4–11.3)

## 2025-01-13 PROCEDURE — 84439 ASSAY OF FREE THYROXINE: CPT

## 2025-01-13 PROCEDURE — 80061 LIPID PANEL: CPT

## 2025-01-13 PROCEDURE — 77080 DXA BONE DENSITY AXIAL: CPT

## 2025-01-13 PROCEDURE — 80053 COMPREHEN METABOLIC PANEL: CPT

## 2025-01-13 PROCEDURE — 77080 DXA BONE DENSITY AXIAL: CPT | Performed by: RADIOLOGY

## 2025-01-13 PROCEDURE — 84443 ASSAY THYROID STIM HORMONE: CPT

## 2025-01-13 PROCEDURE — 85027 COMPLETE CBC AUTOMATED: CPT

## 2025-01-17 ENCOUNTER — HOSPITAL ENCOUNTER (OUTPATIENT)
Dept: RADIOLOGY | Facility: CLINIC | Age: 70
Discharge: HOME | End: 2025-01-17
Payer: MEDICARE

## 2025-01-17 ENCOUNTER — EVALUATION (OUTPATIENT)
Dept: PHYSICAL THERAPY | Facility: CLINIC | Age: 70
End: 2025-01-17
Payer: MEDICARE

## 2025-01-17 VITALS — HEIGHT: 67 IN | BODY MASS INDEX: 26.96 KG/M2 | WEIGHT: 171.74 LBS

## 2025-01-17 DIAGNOSIS — Z12.31 OTHER SCREENING MAMMOGRAM: ICD-10-CM

## 2025-01-17 DIAGNOSIS — N39.3 SUI (STRESS URINARY INCONTINENCE, FEMALE): ICD-10-CM

## 2025-01-17 PROBLEM — M62.81 MUSCLE WEAKNESS: Status: ACTIVE | Noted: 2025-01-17

## 2025-01-17 PROCEDURE — 97161 PT EVAL LOW COMPLEX 20 MIN: CPT | Mod: GP

## 2025-01-17 PROCEDURE — 97110 THERAPEUTIC EXERCISES: CPT | Mod: GP

## 2025-01-17 PROCEDURE — 77067 SCR MAMMO BI INCL CAD: CPT

## 2025-01-17 ASSESSMENT — PAIN SCALES - GENERAL: PAINLEVEL_OUTOF10: 0 - NO PAIN

## 2025-01-17 ASSESSMENT — PAIN - FUNCTIONAL ASSESSMENT: PAIN_FUNCTIONAL_ASSESSMENT: 0-10

## 2025-01-17 NOTE — PROGRESS NOTES
Physical Therapy    Physical Therapy Evaluation and Treatment      Patient Name: Sherice Dickerson  MRN: [id number]  Today's Date: 1/17/2025    Time Entry:   Time Calculation  Start Time: 1000  Stop Time: 1100  Time Calculation (min): 60 min  PT Evaluation Time Entry  PT Evaluation (Low) Time Entry: 37  PT Therapeutic Procedures Time Entry  Therapeutic Exercise Time Entry: 23                   Assessment:    The patient presents to Physical Therapy with signs and symptoms consistent with the diagnosis of mixed urinary incontinence. Key impairments include: PF weakness, hip weakness, inability to hold bladder and difficulty holding bowels, low back pain and difficulty with functional activities such as walking dog, toileting, coughing/laughing/sneezing.    Standardized testing and measures administered today, including ROM, strength, joint mobility testing, and observation which reveal that the patient has multiple impairments in body structure and functions, activity limitations, and participation restrictions. The patient has personal factors and comorbidities that may serve as barriers affecting plan of care. Today's findings indicate that the patient is of low complexity and would benefit from skilled PT to make measurable and meaningful changes in the above outcome measures and achieve improvements in the patient's functional status and individual goals. The patient verbalized understanding and is in agreement with all goals and plan of care.      Plan:   Pt will be seen 1-2x/week for 10 visits. Potential to achieve rehab goals is good. Plan of care was developed with input and agreement by the patient.    Treatment may include: Therapeutic Exercise (PROM, AA/AROM, Flexibility, Strengthening, Stabilization, HEP instruction). Therapeutic Activities (Transfers, Body Mechanics, Work Related Activities, Closed Chain, Agility and Power). Manual Techniques (Soft tissue Mobilization, Joint Mobilization/Distraction, Muscle  Energy Techniques, Lymphatic Drainage, Dry Needling). Neuromuscular Reeducation (Postural Training, Balance/Proprioception, Relaxation Techniques). Biofeedback. Aquatic Exercise. Modalities: Ultrasound, Cryotherapy, Vasopneumatic with or without Cryotherapy. Electrical Stimulation (TENS/IFC/ Premodulated for pain relief, NMES for Muscle reeducation). Gait Training. Orthotic Fit and Training. Strapping, Kinesiotaping.       Current Problem:   1. MANUEL (stress urinary incontinence, female)  Referral to Physical Therapy          Subjective    Pt is a 69  year old female with complaints of MANUEL and low back pain. MANUEL, progressively worsening. IBS which causes constipation and difficulty. Pt reports low back pain is midline and aching.   Onset: 5-6 years ago  Aggravating factors/ Functional Limitations: cold air, walking, tripping, pulling on dog lead, coughing, sneezing, laughing, running water.    Pt denies night pain, unrelenting pain, unexplained weight loss 10-20lb in the last 4 weeks, loss of appetite, unusual lumps or growth, unusual fatigue.  Pt denies numbness/ tingling. Pt denies bowel or bladder changes.    Bladder-  Fluid consumed per day (oz.): 32 oz, takes peppermint tea at night on top of the water.   Pad/protection per day#: 1  per night#: 0 (uses no leak underwear)  Number of night trips to restroom: 1  Day time frequency: between 2-4 hours, goes JIC   Incontinence with coughing, sneezing, physical activity? yes  Sudden uncontrolled urge sensation? sometimes  Do you make it to the bathroom on time? barely  Voiding completely? yes  Post void dribble- sometimes  Bladder irritants (caffeine, alcohol, acidic food etc) - tea for her IBS.   Foods/fluids- sometimes tomato sauce    Bowel-  *harder to hold bowels over the years*  Frequency of movements: usually 1x/day, depends on the day, between constipation to diarrhea   [place] Stool Scale: the whole scale, usually dry side  fecal soiling? Sometimes  rarely  Straining? yes  Complete movement? Not always   Assist? no  Hemmoroids? Yes  Painful movements: sometimes at the anus, sometimes bowel cramping  Has tried squatty potty    Yellow/Red Flags-  Pain while toileting? sometimes  Blood in urine or stool? no  Difficulty emptying completely? sometimes  Difficulty starting flow of urine? No   Sudden/extreme increased urination? No  Safe at home? Yes  Life experiences that could impact medical care? no  History of abuse or exploitation? no  Scars? Unsure     Reproductive- not sexually active  Pt had 3 vaginal deliveries with large for gestational age babies, some tearing reported    Precautions:  Precautions  Precautions Comment: No falls in last 6 mo    Pain:  Pain Assessment  Pain Assessment: 0-10  0-10 (Numeric) Pain Score: 0 - No pain      Objective   Patient provided with education on purpose of internal exam and procedure thereof. Alternative external methods were also offered with the understanding internal examination provides the most useful information. Patient verbalized understanding and consent prior to examination.     Lumbar ROM (degrees):  Flexion: 100%  Extension: 75%  Sidebend R/L: 100%/75%  Rotation R/L: 100% hue  HIP AROM (degrees):  Hip Flexion R/L: > 120 hue  Hip Internal Rotation R/L: 20 hue  Hip External Rotation R/L:   40 hue  Hip Abduction R/L:  > 45 hue    Flexibility (degrees):   Hamstring 90/90 position R/L: 20/30  Rectus (measured prone without spine motion) R/L:95/100    Strength Testing:  Hip Flexion R/L: 4/4  Knee extension R/L:  5/5  Knee Flexion R/L:5/5  Hip Internal Rotation R/L: 4/4  Hip External Rotation R/L:  4/4  DF R/L: 5/5  Hip Abduction R/L:4-/4-  Hip Extension R/L:  3+/4-    Alignment-  possible thoracolumbar scoliosis appreciated in prone and standing flexion  Force Transfer-  Active Straight Leg Raise: pelvic lateral rocking hue    INTERNAL VAGINAL EXAMINATION:  PFM Strength: 2/5  Coordination: 5 in 10 seconds   Endurance:  4 second holds x 1 repetition      Outcome Measures:  NIH CPSI: 9    Treatments:    Therapeutic Exercise: Beginners PFC HEP    Edu: overcoming urge, water intake, constipation management, Kegels and cueing, PF anatomy, edu on pt condition and POC    Plan: rib mobs with breathing, ANS breathing, TA, hip strengthening, PFC, PFC + exercise, pressure management,     Goals:  Patient will report pain levels of 0/10 at rest and 2/10 with activity.  Patient will report daytime voiding frequency 1x every 2-4 hours.    Patient will report decreased episodes of leaking by 80% or more overall.    Patient will evacuate bowels between 2x/day to every 2 days with type 3 or 4 on [place] Stool Scale.    Patient will demo normal resting tone in pelvic floor with a score of 5 for Pelvic Muscle Strength Rating Scale.   Patient will demonstrate good lumbo-pelvic dissociation to increase postural awareness and alignment for toileting and strengthening.  Patient will demonstrate lumbar AROM to 100% for flexion, extension, side bending and rotation to improve lumbopelvic mechanics.  Patient will improve hip IR >/= 40 deg, ER >/= 45 deg to balance forces through hip and pelvis.   Patient will improve hip extension and abduction strength to >/= 4+/5.  Patient will demonstrate good TA control with [place] Level 3 exercises.    Patient will demonstrate the ability to contract-relax-and eccentrically lengthen the pelvic floor.    Patient will be knowledgeable of healthy bowel/bladder habits measured by teach back.    Patient will be knowledgeable of tools/strategies to calm the ANS measured by teach back.    Patient will demonstrate good diaphragm excursion/rib expansion.    Patient will be independent with a home exercise program.  NIH-CPSI score will improve by 6 points to demo MCID.

## 2025-01-24 ENCOUNTER — TREATMENT (OUTPATIENT)
Dept: PHYSICAL THERAPY | Facility: CLINIC | Age: 70
End: 2025-01-24
Payer: MEDICARE

## 2025-01-24 DIAGNOSIS — M62.81 MUSCLE WEAKNESS: Primary | ICD-10-CM

## 2025-01-24 DIAGNOSIS — N39.3 SUI (STRESS URINARY INCONTINENCE, FEMALE): ICD-10-CM

## 2025-01-24 DIAGNOSIS — M17.12 ARTHRITIS OF KNEE, LEFT: ICD-10-CM

## 2025-01-24 PROBLEM — M23.307 MENISCUS DEGENERATION, LEFT: Status: ACTIVE | Noted: 2025-01-24

## 2025-01-24 PROCEDURE — 97110 THERAPEUTIC EXERCISES: CPT | Mod: GP

## 2025-01-24 ASSESSMENT — PAIN - FUNCTIONAL ASSESSMENT: PAIN_FUNCTIONAL_ASSESSMENT: 0-10

## 2025-01-24 ASSESSMENT — PAIN SCALES - GENERAL: PAINLEVEL_OUTOF10: 2

## 2025-01-24 NOTE — PROGRESS NOTES
Physical Therapy    Physical Therapy Treatment      Patient Name: Sherice Dickerson  MRN: [id number]  Today's Date: 1/24/2025    Time Entry:   Time Calculation  Start Time: 0900  Stop Time: 0958  Time Calculation (min): 58 min     PT Therapeutic Procedures Time Entry  Therapeutic Exercise Time Entry: 56                   Assessment:  Pt demos good response to today's treatment, some limiting factors include shoulder and knee pain which prevent full participation in exercise program. Pt tasked with applying PFC to her ADLs to improve coordination of PF with prime movers and mange pressures.       Plan:  Continue per plan of care as tolerated. Patient to continue with HEP each day independently.      Current Problem:   1. Muscle weakness        2. Arthritis of knee, left  Follow Up In Physical Therapy      3. MANUEL (stress urinary incontinence, female)              Subjective    Pt reports PF fatigue/leaks after her PFC exercises. Pt reports leak when slipping on ice, no injury sustained.  Pt reports going against [place] at the [facility] center for exercise. She is avoiding the gym (weights) due to her knee injuries.   No leaks on dog walk this week.    MANUEL: continues  IBS: improved this week  Pain: low back pain is unchanged, midline and aching.     Bladder-  Fluid consumed per day (oz.): 32 oz is inching up by a couple of ounces.   Day time frequency: between 2-4 hours, goes JIC     Bowel-  [place] Stool Scale: the whole scale, usually dry side  fecal soiling? Sometimes rarely  Straining? yes  Complete movement? Not always   Hemmoroids? Yes  Painful movements: sometimes at the anus, sometimes bowel cramping  Has tried squatty potty      Precautions:  Precautions  Precautions Comment: No falls in last 6 mo    Pain:  Pain Assessment  Pain Assessment: 0-10  0-10 (Numeric) Pain Score: 2  Pain Location: Back      Objective   Improved lateral sidebending with L shoulder abduction  Good belly breathing, unable to breathe  through nose from congestion    Treatments:    Therapeutic Exercise:  Diaphragmatic breathing 2 min  4/5/8 Breathing 5 cycles   LTR 1 min  Thoracic rotation   Elevated Beginners PFC 5x10s  Bridge Blue TB x10, +ER 2x10  Seated resisted ribcage breathing L arm up  STS w PFC  Cough w PFC     Later- TA

## 2025-01-27 ENCOUNTER — APPOINTMENT (OUTPATIENT)
Dept: SLEEP MEDICINE | Facility: CLINIC | Age: 70
End: 2025-01-27
Payer: MEDICARE

## 2025-01-31 ENCOUNTER — TREATMENT (OUTPATIENT)
Dept: PHYSICAL THERAPY | Facility: CLINIC | Age: 70
End: 2025-01-31
Payer: MEDICARE

## 2025-01-31 DIAGNOSIS — N39.3 SUI (STRESS URINARY INCONTINENCE, FEMALE): ICD-10-CM

## 2025-01-31 DIAGNOSIS — M62.81 MUSCLE WEAKNESS: Primary | ICD-10-CM

## 2025-01-31 DIAGNOSIS — M17.12 ARTHRITIS OF KNEE, LEFT: ICD-10-CM

## 2025-01-31 PROCEDURE — 97110 THERAPEUTIC EXERCISES: CPT | Mod: GP

## 2025-01-31 PROCEDURE — 97140 MANUAL THERAPY 1/> REGIONS: CPT | Mod: GP

## 2025-01-31 ASSESSMENT — PAIN SCALES - GENERAL: PAINLEVEL_OUTOF10: 2

## 2025-01-31 ASSESSMENT — PAIN - FUNCTIONAL ASSESSMENT: PAIN_FUNCTIONAL_ASSESSMENT: 0-10

## 2025-01-31 NOTE — PROGRESS NOTES
Physical Therapy    Physical Therapy Treatment      Patient Name: Sherice Dickerson  MRN: [id number]  Today's Date: 1/31/2025    Time Entry:   Time Calculation  Start Time: 1100  Stop Time: 1158  Time Calculation (min): 58 min     PT Therapeutic Procedures Time Entry  Manual Therapy Time Entry: 10  Therapeutic Exercise Time Entry: 48                   Assessment:  Pt responded well to today's session with improved pain with lat release. Pt will benefit from possible hip flexor assessment next visit, and cervical mobilization to manage the asymmetries in her spine.       Plan:  Continue per plan of care as tolerated. Patient to continue with HEP each day independently.      Current Problem:   1. Muscle weakness        2. MANUEL (stress urinary incontinence, female)        3. Arthritis of knee, left  Follow Up In Physical Therapy            Subjective    Pt reports PF fatigue/leaks after her PFC exercises.   Pt still going against the [employer] [place] at the [facility] center for exercise.   One leaks on dog walk this week in the cold,  Modified shoulder position while doing thoracic rotation.     MANUEL: continues  IBS: improved this week  Pain: low back pain is unchanged, midline and aching.     Bladder-  Fluid consumed per day (oz.): 32 oz is inching up by a couple of ounces. Drinking herbal tea.   Day time frequency: between 2-4 hours, goes JIC       Precautions:  Precautions  Precautions Comment: No falls in last 6 mo    Pain:  Pain Assessment  Pain Assessment: 0-10  0-10 (Numeric) Pain Score: 2  Pain Location: Back      Objective   Gait: L trunk shift    Treatments:    Therapeutic Exercise:  Diaphragmatic breathing 2 min  4/5/8 Breathing 5 cycles   LTR 1 min  TA 5x10s  TA + walkout 1 min  TA + march x10 ea  Thoracic rotation   PFC 5x10s  Bridge Blue TB x10, +ER 2x10  Seated resisted ribcage breathing L arm up  STS w PFC  Cough w PFC   NEXT VISIT: cervical rotation      Manual:   Subhash lat release

## 2025-02-03 ENCOUNTER — APPOINTMENT (OUTPATIENT)
Dept: SLEEP MEDICINE | Facility: CLINIC | Age: 70
End: 2025-02-03
Payer: MEDICARE

## 2025-02-03 VITALS
WEIGHT: 176 LBS | DIASTOLIC BLOOD PRESSURE: 68 MMHG | HEART RATE: 77 BPM | SYSTOLIC BLOOD PRESSURE: 114 MMHG | HEIGHT: 67 IN | TEMPERATURE: 97.8 F | BODY MASS INDEX: 27.62 KG/M2

## 2025-02-03 DIAGNOSIS — G47.33 OSA (OBSTRUCTIVE SLEEP APNEA): ICD-10-CM

## 2025-02-03 DIAGNOSIS — Z72.821 INADEQUATE SLEEP HYGIENE: Primary | ICD-10-CM

## 2025-02-03 DIAGNOSIS — G25.81 RLS (RESTLESS LEGS SYNDROME): ICD-10-CM

## 2025-02-03 DIAGNOSIS — G47.09 OTHER INSOMNIA: ICD-10-CM

## 2025-02-03 DIAGNOSIS — G47.29: ICD-10-CM

## 2025-02-03 DIAGNOSIS — I10 BENIGN ESSENTIAL HYPERTENSION: ICD-10-CM

## 2025-02-03 DIAGNOSIS — I48.91 ATRIAL FIBRILLATION, NEW ONSET (MULTI): ICD-10-CM

## 2025-02-03 PROCEDURE — 1036F TOBACCO NON-USER: CPT | Performed by: STUDENT IN AN ORGANIZED HEALTH CARE EDUCATION/TRAINING PROGRAM

## 2025-02-03 PROCEDURE — 1160F RVW MEDS BY RX/DR IN RCRD: CPT | Performed by: STUDENT IN AN ORGANIZED HEALTH CARE EDUCATION/TRAINING PROGRAM

## 2025-02-03 PROCEDURE — 99214 OFFICE O/P EST MOD 30 MIN: CPT | Performed by: STUDENT IN AN ORGANIZED HEALTH CARE EDUCATION/TRAINING PROGRAM

## 2025-02-03 PROCEDURE — G2211 COMPLEX E/M VISIT ADD ON: HCPCS | Performed by: STUDENT IN AN ORGANIZED HEALTH CARE EDUCATION/TRAINING PROGRAM

## 2025-02-03 PROCEDURE — 3008F BODY MASS INDEX DOCD: CPT | Performed by: STUDENT IN AN ORGANIZED HEALTH CARE EDUCATION/TRAINING PROGRAM

## 2025-02-03 PROCEDURE — 3078F DIAST BP <80 MM HG: CPT | Performed by: STUDENT IN AN ORGANIZED HEALTH CARE EDUCATION/TRAINING PROGRAM

## 2025-02-03 PROCEDURE — 3074F SYST BP LT 130 MM HG: CPT | Performed by: STUDENT IN AN ORGANIZED HEALTH CARE EDUCATION/TRAINING PROGRAM

## 2025-02-03 PROCEDURE — 1123F ACP DISCUSS/DSCN MKR DOCD: CPT | Performed by: STUDENT IN AN ORGANIZED HEALTH CARE EDUCATION/TRAINING PROGRAM

## 2025-02-03 PROCEDURE — 1159F MED LIST DOCD IN RCRD: CPT | Performed by: STUDENT IN AN ORGANIZED HEALTH CARE EDUCATION/TRAINING PROGRAM

## 2025-02-03 ASSESSMENT — ENCOUNTER SYMPTOMS
CONSTITUTIONAL NEGATIVE: 1
RESPIRATORY NEGATIVE: 1
NEUROLOGICAL NEGATIVE: 1
CARDIOVASCULAR NEGATIVE: 1
PSYCHIATRIC NEGATIVE: 1

## 2025-02-03 ASSESSMENT — SLEEP AND FATIGUE QUESTIONNAIRES
HOW LIKELY ARE YOU TO NOD OFF OR FALL ASLEEP WHILE SITTING QUIETLY AFTER LUNCH WITHOUT ALCOHOL: WOULD NEVER DOZE
HOW LIKELY ARE YOU TO NOD OFF OR FALL ASLEEP WHILE SITTING AND TALKING TO SOMEONE: WOULD NEVER DOZE
WORRIED_DISTRESSED_DUE_TO_SLEEP: A LITTLE
HOW LIKELY ARE YOU TO NOD OFF OR FALL ASLEEP WHILE LYING DOWN TO REST IN THE AFTERNOON WHEN CIRCUMSTANCES PERMIT: SLIGHT CHANCE OF DOZING
SLEEP_PROBLEM_NOTICEABLE_TO_OTHERS: SOMEWHAT
DIFFICULTY_STAYING_ASLEEP: MILD
HOW LIKELY ARE YOU TO NOD OFF OR FALL ASLEEP WHEN YOU ARE A PASSENGER IN A CAR FOR AN HOUR WITHOUT A BREAK: SLIGHT CHANCE OF DOZING
HOW LIKELY ARE YOU TO NOD OFF OR FALL ASLEEP WHILE SITTING AND READING: MODERATE CHANCE OF DOZING
SLEEP_PROBLEM_INTERFERES_DAILY_ACTIVITIES: SOMEWHAT
SITING INACTIVE IN A PUBLIC PLACE LIKE A CLASS ROOM OR A MOVIE THEATER: SLIGHT CHANCE OF DOZING
HOW LIKELY ARE YOU TO NOD OFF OR FALL ASLEEP WHILE WATCHING TV: MODERATE CHANCE OF DOZING
SATISFACTION_WITH_CURRENT_SLEEP_PATTERN: SATISFIED
ESS-CHAD TOTAL SCORE: 7
WAKING_TOO_EARLY: MILD
DIFFICULTY_FALLING_ASLEEP: MILD
HOW LIKELY ARE YOU TO NOD OFF OR FALL ASLEEP IN A CAR, WHILE STOPPED FOR A FEW MINUTES IN TRAFFIC: WOULD NEVER DOZE

## 2025-02-03 NOTE — ASSESSMENT & PLAN NOTE
Ferritin at 55  Cannot take iron supplement, but chelated iron seemed to be ok (IBS and constipation issue)    Stable right now, not particularly bothersome at this time    Will continue to monitor

## 2025-02-03 NOTE — ASSESSMENT & PLAN NOTE
"Likely due to change of daily routine and social interaction 2/2 COVID pandemic  Exacerbated by performance anxiety of wanting to sleep and to make sleep happen  Aggravating factors including lack of daily structure and inadequate sleep hygiene  We will start with avoiding naps (gael. Falling asleep in front of the TV in the evening), not reading in bed, \"let sleep come to her\"  Melatonin can be taken with dinner    9/9/2024 - after practicing better sleep hygiene and taking melatonin at a different time, now her sleep onset problem is pretty much resolved.  She goes to sleep around MN and falls asleep generally within 30 min.  She wakes around 8:30 am and feels fine.    2/3/2025 - she continues to do well.  Daily routine even better established.  She is feeling better overall.    We will follow-up one more time and if she continues to do well, then we can change our visits to PRN  "

## 2025-02-03 NOTE — PROGRESS NOTES
" Patient: Sherice Dickerson    [id number]  : 1955 -- AGE 69 y.o.    Provider: Joshua Silver MD     Location [facility]   Service Date: 2/3/2025              [facility]  Followup Visit Note        ASSESSMENT/PLAN     Ms. Dickerson is a 69 y.o. female and she returns in followup to the [facility] for the problems listed below on 25     Problem List, Orders, Assessment, Recommendations:  Problem List Items Addressed This Visit             ICD-10-CM    Atrial fibrillation, new onset (Multi) I48.91     Managed by cardiologist,paroxysmal         Benign essential hypertension I10     BP Readings from Last 1 Encounters:   25 114/68     - doing well, asymptomatic  - discussed at length the impact of untreated PHILLY and BP control  - continue current management and follow-up with PCP            Other insomnia G47.09     Likely due to change of daily routine and social interaction 2/2 COVID pandemic  Exacerbated by performance anxiety of wanting to sleep and to make sleep happen  Aggravating factors including lack of daily structure and inadequate sleep hygiene  We will start with avoiding naps (gael. Falling asleep in front of the TV in the evening), not reading in bed, \"let sleep come to her\"  Melatonin can be taken with dinner    2024 - after practicing better sleep hygiene and taking melatonin at a different time, now her sleep onset problem is pretty much resolved.  She goes to sleep around MN and falls asleep generally within 30 min.  She wakes around 8:30 am and feels fine.    2/3/2025 - she continues to do well.  Daily routine even better established.  She is feeling better overall.    We will follow-up one more time and if she continues to do well, then we can change our visits to PRN         Inadequate sleep hygiene - Primary Z72.821     Doing very well with her improving sleep hygiene.  She is turning off TV a lot earlier now " and would then go to sleep.  She still reads in bed, but usually about 3 pages in, she would feel drowsy and then go to sleep within about 20 minutes    Keep up the good habit and reinforce on the importance of good daily routine.  Avoiding naps, etc was done well also.         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    RLS (restless legs syndrome) G25.81     Ferritin at 55  Cannot take iron supplement, but chelated iron seemed to be ok (IBS and constipation issue)    Stable right now, not particularly bothersome at this time    Will continue to monitor         Relevant Orders    Follow Up In Adult Sleep Medicine    PHILLY (obstructive sleep apnea) G47.33     We reviewed the results from her HSAT (AHI ~ 17) in detail and explored treatment options  Her current symptoms is once a night nocturnal waking, but she has no problem going back to sleep    She does have paroxysmal a-fib with no immediate plan for cardioversion or ablation      We will hold off on treating her PHILLY          Disposition  Return to clinic in 8 months       HISTORY OF PRESENT ILLNESS     HISTORY OF PRESENT ILLNESS   Sherice Dickerson is a 69 y.o. female with h/o PHILLY and Insomnia who presents to a [facility] for followup.     Assessment and plan from last visit: 9/9/2024    Ms. Dickerson is a 69 y.o. female and she returns in followup to the [facility] for PHILLY, Insomnia, and RLS.     Problem List, Orders, Assessment, Recommendations:  Problem List Items Addressed This Visit               ICD-10-CM     Atrial fibrillation, new onset (Multi) I48.91     Benign essential hypertension I10           BP Readings from Last 1 Encounters:   09/09/24 136/80      - doing well, asymptomatic  - discussed at length the impact of untreated PHILLY and BP control  - continue current management and follow-up with PCP               Other insomnia G47.09  "      Likely due to change of daily routine and social interaction 2/2 COVID pandemic  Exacerbated by performance anxiety of wanting to sleep and to make sleep happen  Aggravating factors including lack of daily structure and inadequate sleep hygiene  We will start with avoiding naps (gael. Falling asleep in front of the TV in the evening), not reading in bed, \"let sleep come to her\"  Melatonin can be taken with dinner    - after practicing better sleep hygiene and taking melatonin at a different time, now her sleep onset problem is pretty much resolved.  She goes to sleep around [place] and falls asleep generally within 30 min.  She wakes around 8:30 am and feels fine.           Inadequate sleep hygiene Z72.821       Start with stimulus control and reserving bed for sleep only -> doing well and she knows the right thing to do and will continue to practice good sleep hygiene           RLS (restless legs syndrome) - Primary G25.81       Ferritin at 55  Cannot take iron supplement, but chelated iron seemed to be ok (IBS and constipation issue)  We will recheck Ferritin at follow-up, depending on symptom improvement, we will explore meds vs IV iron           Relevant Orders     Follow Up In Adult Sleep Medicine     PHILLY (obstructive sleep apnea) G47.33       We reviewed the results from her HSAT (AHI ~ 17) in detail and explored treatment options  At least time, I think watchful waiting is a reasonable approach.  She may want to explore a bit more on positional therapy.  She is not all that symptomatic per se.     She does have paroxysmal a-fib with no immediate plan for cardioversion or ablation               Disposition     Return to clinic in 4-5 months    Current History    On today's visit, the patient reports that she has been doing well since our last visit.  Sleep onset issue is resolved.  Falling asleep within about 20 minutes.  Not napping during the day.  She is very functional and now have established a pretty " good daily routine including social interactions and physical activities.  She is happy with where she is at.  Her RLS has not been bothering her as much now.    RLS Followup:   See above    Daytime Symptoms    Patient reports DAYTIME SYMPTOMS: no daytime symptoms  Patient denies daytime symptoms including: Denies: excessive daytime sleepiness    Naps: No  Fatigue: denies feeling fatigue    ESS: 7  MARTA: 9  FOSQ: 37    REVIEW OF SYSTEMS     REVIEW OF SYSTEMS  Review of Systems   Constitutional: Negative.    HENT: Negative.     Respiratory: Negative.     Cardiovascular: Negative.    Genitourinary: Negative.    Skin: Negative.    Neurological: Negative.    Psychiatric/Behavioral: Negative.           ALLERGIES AND MEDICATIONS     ALLERGIES  No Known Allergies    MEDICATIONS: She has a current medication list which includes the following prescription(s): atorvastatin - TAKE 1 TABLET DAILY, cholecalciferol - Take 1 tablet (1,000 Units) by mouth once daily, ciclopirox - Apply topically once daily at bedtime, estradiol - Insert a pea-sized amount into vagina at bedtime three times per week, ferrous sulfate - Do not crush, chew, or split, levothyroxine - Take 1 tablet (88 mcg) by mouth once daily in the morning. Take before meals, lisinopril - TAKE 1 TABLET DAILY, and xarelto - TAKE 1 TABLET DAILY.    PAST MEDICAL HISTORY : She  has a past medical history of Breast cyst (1971), Cancer (Multi) (6/12/18), Elevated blood-pressure reading, without diagnosis of hypertension (07/06/2020), Fibrocystic breast (1971), Hypertension (2018), Nontoxic single thyroid nodule (12/27/2018), and Personal history of irradiation (2018 radioactive iodine forvthyroid cancer).    PAST SURGICAL HISTORY: She  has a past surgical history that includes Tonsillectomy (06/05/2014); Colonoscopy (09/21/2016); Total thyroidectomy (06/12/2018); and Breast biopsy (2010).     FAMILY HISTORY: No changes since previous visit. Otherwise non-contributory as  "charted.     SOCIAL HISTORY  She  reports that she has never smoked. She has never used smokeless tobacco. She reports that she does not currently use alcohol. She reports that she does not use drugs.       PHYSICAL EXAM     VITAL SIGNS: /68 (BP Location: Right arm, Patient Position: Sitting, BP Cuff Size: Adult)   Pulse 77   Temp 36.6 °C (97.8 °F) (Temporal)   Ht 1.702 m (5' 7\")   Wt 79.8 kg (176 lb)   BMI 27.57 kg/m²      PREVIOUS WEIGHTS:  Wt Readings from Last 3 Encounters:   02/03/25 79.8 kg (176 lb)   01/17/25 77.9 kg (171 lb 11.8 oz)   01/06/25 77.9 kg (171 lb 12.8 oz)         RESULTS/DATA     Bicarbonate (mmol/L)   Date Value   01/13/2025 29   11/17/2023 30   11/23/2022 30   09/09/2021 28   10/01/2020 29     Iron (ug/dL)   Date Value   05/31/2024 134     % Saturation (%)   Date Value   05/31/2024 32     TIBC (ug/dL)   Date Value   05/31/2024 418     Ferritin (ng/mL)   Date Value   05/31/2024 55                 "

## 2025-02-03 NOTE — ASSESSMENT & PLAN NOTE
BP Readings from Last 1 Encounters:   02/03/25 114/68     - doing well, asymptomatic  - discussed at length the impact of untreated PHILLY and BP control  - continue current management and follow-up with PCP

## 2025-02-03 NOTE — ASSESSMENT & PLAN NOTE
We reviewed the results from her HSAT (AHI ~ 17) in detail and explored treatment options  Her current symptoms is once a night nocturnal waking, but she has no problem going back to sleep    She does have paroxysmal a-fib with no immediate plan for cardioversion or ablation      We will hold off on treating her PHILLY

## 2025-02-03 NOTE — ASSESSMENT & PLAN NOTE
Doing very well with her improving sleep hygiene.  She is turning off TV a lot earlier now and would then go to sleep.  She still reads in bed, but usually about 3 pages in, she would feel drowsy and then go to sleep within about 20 minutes    Keep up the good habit and reinforce on the importance of good daily routine.  Avoiding naps, etc was done well also.

## 2025-02-07 ENCOUNTER — TREATMENT (OUTPATIENT)
Dept: PHYSICAL THERAPY | Facility: CLINIC | Age: 70
End: 2025-02-07
Payer: MEDICARE

## 2025-02-07 DIAGNOSIS — M17.12 ARTHRITIS OF KNEE, LEFT: ICD-10-CM

## 2025-02-07 DIAGNOSIS — M62.81 MUSCLE WEAKNESS: Primary | ICD-10-CM

## 2025-02-07 DIAGNOSIS — N39.3 SUI (STRESS URINARY INCONTINENCE, FEMALE): ICD-10-CM

## 2025-02-07 PROCEDURE — 97110 THERAPEUTIC EXERCISES: CPT | Mod: GP

## 2025-02-07 PROCEDURE — 97140 MANUAL THERAPY 1/> REGIONS: CPT | Mod: GP

## 2025-02-07 ASSESSMENT — PAIN SCALES - GENERAL: PAINLEVEL_OUTOF10: 2

## 2025-02-07 ASSESSMENT — PAIN - FUNCTIONAL ASSESSMENT: PAIN_FUNCTIONAL_ASSESSMENT: 0-10

## 2025-02-07 NOTE — PROGRESS NOTES
Physical Therapy    Physical Therapy Treatment      Patient Name: Sherice Dickerson  MRN: [id number]  Today's Date: 2/7/2025    Time Entry:   Time Calculation  Start Time: 1001  Stop Time: 1055  Time Calculation (min): 54 min     PT Therapeutic Procedures Time Entry  Manual Therapy Time Entry: 24  Therapeutic Exercise Time Entry: 30                   Assessment:  Pt demos cervical tightness which was addressed with today's interventions in order to promote more symmetrical posturing and freedom of movement. Pt will benefit from continued increases in fluid intake.       Plan:  Continue per plan of care as tolerated. Patient to continue with HEP each day independently.      Current Problem:   1. Muscle weakness        2. Arthritis of knee, left  Follow Up In Physical Therapy      3. MANUEL (stress urinary incontinence, female)                Subjective    Pt reports thinking she was getting better but had a leak yesterday.   PF fatigue/leaks after her PFC exercises, but getting better.   Pain: Back, pain below R glute, no numbness or tingling.     Shoulder is improving.     MANUEL: continues  IBS: normal for her  Pain: low back pain is unchanged, midline and aching. Is swimming today.     Bladder-  Fluid consumed per day (oz.): 32 oz is inching up by a couple of ounces. Drinking herbal tea.   Day time frequency: between 2-4 hours, trying to eliminate JIC     Precautions:  Precautions  Precautions Comment: No falls in last 6 mo    Pain:  Pain Assessment  Pain Assessment: 0-10  0-10 (Numeric) Pain Score: 2  Pain Location: Back      Objective   R>L UT hypertrophy  Juliocesar test WNL hue  Cervical AROM: R 52, L 41    Treatments:    Therapeutic Exercise:  Diaphragmatic breathing 2 min  4/5/8 Breathing 5 cycles   LTR 1 min  TA 5x10s  TA + walkout 1 min  TA + march x10 ea  TA + leg ext x10 ea  Thoracic rotation   PFC 5x10s  Bridge Blue TB x10, +ER 2x10  Seated resisted ribcage breathing YTB L arm up  STS w PFC  Cough w PFC   cervical  rotation x20       Manual:   Subhash UT release  SO release   Gentle cervical distraction  Subhash lat release

## 2025-02-14 ENCOUNTER — TREATMENT (OUTPATIENT)
Dept: PHYSICAL THERAPY | Facility: CLINIC | Age: 70
End: 2025-02-14
Payer: MEDICARE

## 2025-02-14 DIAGNOSIS — M23.307 MENISCUS DEGENERATION, LEFT: ICD-10-CM

## 2025-02-14 DIAGNOSIS — N39.3 SUI (STRESS URINARY INCONTINENCE, FEMALE): Primary | ICD-10-CM

## 2025-02-14 DIAGNOSIS — G89.29 CHRONIC PAIN OF BOTH KNEES: ICD-10-CM

## 2025-02-14 DIAGNOSIS — M62.81 MUSCLE WEAKNESS: ICD-10-CM

## 2025-02-14 DIAGNOSIS — M25.561 CHRONIC PAIN OF BOTH KNEES: ICD-10-CM

## 2025-02-14 DIAGNOSIS — M17.12 ARTHRITIS OF KNEE, LEFT: ICD-10-CM

## 2025-02-14 DIAGNOSIS — M25.562 CHRONIC PAIN OF BOTH KNEES: ICD-10-CM

## 2025-02-14 PROCEDURE — 97140 MANUAL THERAPY 1/> REGIONS: CPT | Mod: GP

## 2025-02-14 PROCEDURE — 97110 THERAPEUTIC EXERCISES: CPT | Mod: GP

## 2025-02-14 ASSESSMENT — PAIN - FUNCTIONAL ASSESSMENT: PAIN_FUNCTIONAL_ASSESSMENT: 0-10

## 2025-02-14 ASSESSMENT — PAIN SCALES - GENERAL: PAINLEVEL_OUTOF10: 1

## 2025-02-14 NOTE — PROGRESS NOTES
Physical Therapy    Physical Therapy Treatment      Patient Name: Sherice Dickerson  MRN: [id number]  Today's Date: 2/14/2025    Time Entry:   Time Calculation  Start Time: 0900  Stop Time: 0955  Time Calculation (min): 55 min     PT Therapeutic Procedures Time Entry  Manual Therapy Time Entry: 10  Therapeutic Exercise Time Entry: 45                   Assessment:  Pt demos cervical ROM improvements since last visit. Pt limited by fall, spinal asymmetries, and lack of hydration. Pt will benefit from continued hip/pelvis strengthening and increases in fluid intake. Will re-assess next visit.      Plan:  Continue per plan of care as tolerated. Patient to continue with HEP each day independently.      Current Problem:   1. MANUEL (stress urinary incontinence, female)        2. Muscle weakness                  Subjective    Pt reports a fall slip on ice on Sunday reaching for car door handle.  Denies concussive symptoms. Urinary leakage with the fall. Not worse UI overall.       thinking she was getting better but had a leak yesterday.   PF fatigue/leaks after her PFC exercises, but getting better.     Shoulder is improving.     MANUEL: continues  IBS: normal for her, had acupuncture.   Pain: low back pain is unchanged, midline and aching. Is swimming today.     Bladder-  Fluid consumed per day (oz.): 32 oz is inching up by a couple of ounces. Drinking herbal tea.   Day time frequency: between 2-4 hours, trying to eliminate JIC     Precautions:  Precautions  Precautions Comment: No falls in last 6 mo    Pain:  Pain Assessment  Pain Assessment: 0-10  0-10 (Numeric) Pain Score: 1  Pain Location: Back      Objective   L UT TTP  Cervical AROM: R 61, L 52    Treatments:    Therapeutic Exercise:  Diaphragmatic breathing 2 min  4/5/8 Breathing 5 cycles   LTR 1 min  TA 5x10s  TA + walkout 1 min  TA + march x10 ea  TA + leg ext x10 ea  Thoracic rotation with shoulder modification   PFC 5x10s  Bridge Blue TB x10, +ER 2x10  Seated resisted  ribcage breathing YTB L arm up  STS w PFC  Cough w PFC  NV  cervical rotation x20       Manual:   Subhash UT release  SO release   Gentle cervical distraction  Subhash lat release NV

## 2025-02-21 ENCOUNTER — TREATMENT (OUTPATIENT)
Dept: PHYSICAL THERAPY | Facility: CLINIC | Age: 70
End: 2025-02-21
Payer: MEDICARE

## 2025-02-21 DIAGNOSIS — M17.12 ARTHRITIS OF KNEE, LEFT: ICD-10-CM

## 2025-02-21 DIAGNOSIS — M62.81 MUSCLE WEAKNESS: ICD-10-CM

## 2025-02-21 DIAGNOSIS — N39.3 SUI (STRESS URINARY INCONTINENCE, FEMALE): Primary | ICD-10-CM

## 2025-02-21 PROCEDURE — 97110 THERAPEUTIC EXERCISES: CPT | Mod: GP

## 2025-02-21 ASSESSMENT — PAIN SCALES - GENERAL: PAINLEVEL_OUTOF10: 1

## 2025-02-21 ASSESSMENT — PAIN - FUNCTIONAL ASSESSMENT: PAIN_FUNCTIONAL_ASSESSMENT: 0-10

## 2025-02-21 NOTE — PROGRESS NOTES
Physical Therapy    Physical Therapy Treatment      Patient Name: Sherice Dickerson  MRN: [id number]  Today's Date: 2/21/2025    Time Entry:   Time Calculation  Start Time: 0902  Stop Time: 0948  Time Calculation (min): 46 min     PT Therapeutic Procedures Time Entry  Therapeutic Exercise Time Entry: 46                   Assessment:  Pt demos continued cervical ROM improvements since last visit. Pt also demos improvement in hip abduction and extension strength, TA recruitment, and subjective OM. MANUEL is moderately improved. Pt will return in about a month if continued MANUEL and issues persist. Pt recommended to stretch HS, quad and back.       Plan:  Continue per plan of care as tolerated. Patient to continue with HEP each day independently.      Current Problem:   1. MANUEL (stress urinary incontinence, female)        2. Arthritis of knee, left  Follow Up In Physical Therapy      3. Muscle weakness              Subjective    Pt reports recovering from fall without issue.   Overall slight improvements with leakage. MANUEL is improving, except for sneezing,   Shoulder is improving.   IBS: normal for her, had acupuncture.   Pain: low back pain is unchanged, midline and aching. Is swimming today.     Bladder-  Fluid consumed per day (oz.): 32 oz is inching up by a couple of ounces. Drinking herbal tea.   Day time frequency: between 2-4 hours, trying to eliminate JIC     Precautions:  Precautions  Precautions Comment: No falls in last 6 mo    Pain:  Pain Assessment  Pain Assessment: 0-10  0-10 (Numeric) Pain Score: 1  Pain Location: Back    Outcome Measures:  NIH CPSI:     3    Objective   Cervical AROM: R 66, L 56    Lumbar ROM (degrees):  Flexion: 100%  Extension: 75%  Sidebend R/L: 100%/75%  Rotation R/L: 100% hue  HIP AROM (degrees):  Hip Flexion R/L: > 120 hue  Hip Internal Rotation R/L: 20 hue  Hip External Rotation R/L:   40 hue  Hip Abduction R/L:  > 45 hue     Flexibility (degrees):   Hamstring 90/90 position R/L:  20/30  Rectus (measured prone without spine motion) R/L:95/100     Strength Testing:  Hip Flexion R/L: 4/4  Knee extension R/L:  5/5  Knee Flexion R/L:5/5  Hip Internal Rotation R/L: 4/4  Hip External Rotation R/L:  4/4  Hip Abduction R/L:4-/4-  Hip Extension R/L: 4/4      Alignment-  possible thoracolumbar scoliosis appreciated in prone and standing flexion  Force Transfer-  Active Straight Leg Raise: improvedpelvic lateral rocking subhash    Treatments:    Therapeutic Exercise:  Diaphragmatic breathing 2 min  4/5/8 Breathing 5 cycles   LTR 1 min  TA 5x10s  TA + walkout 1 min  TA + march x10 ea  TA + leg ext x10 ea  Thoracic rotation with shoulder modification   PFC 5x10s  Bridge Blue TB x10, +ER 2x10  Seated resisted ribcage breathing YTB L arm up  STS w PFC  Cough w PFC  NV  cervical rotation x20     Manual:   Not this visit-  Subhash UT release  SO release   Gentle cervical distraction  Subhash lat release NV

## 2025-03-21 ENCOUNTER — TREATMENT (OUTPATIENT)
Dept: PHYSICAL THERAPY | Facility: CLINIC | Age: 70
End: 2025-03-21
Payer: MEDICARE

## 2025-03-21 DIAGNOSIS — M62.81 MUSCLE WEAKNESS: ICD-10-CM

## 2025-03-21 DIAGNOSIS — N39.3 SUI (STRESS URINARY INCONTINENCE, FEMALE): ICD-10-CM

## 2025-03-21 DIAGNOSIS — I10 BENIGN ESSENTIAL HYPERTENSION: ICD-10-CM

## 2025-03-21 PROCEDURE — 97140 MANUAL THERAPY 1/> REGIONS: CPT | Mod: GP

## 2025-03-21 PROCEDURE — 97110 THERAPEUTIC EXERCISES: CPT | Mod: GP

## 2025-03-21 RX ORDER — LISINOPRIL 5 MG/1
5 TABLET ORAL DAILY
Qty: 90 TABLET | Refills: 3 | Status: SHIPPED | OUTPATIENT
Start: 2025-03-21

## 2025-03-21 ASSESSMENT — PAIN SCALES - GENERAL: PAINLEVEL_OUTOF10: 1

## 2025-03-21 ASSESSMENT — PAIN - FUNCTIONAL ASSESSMENT: PAIN_FUNCTIONAL_ASSESSMENT: 0-10

## 2025-03-21 NOTE — PROGRESS NOTES
Physical Therapy    Physical Therapy Treatment      Patient Name: Sherice Dickerson  MRN: [id number]  Today's Date: 3/21/2025    Time Entry:   Time Calculation  Start Time: 1000  Stop Time: 1045  Time Calculation (min): 45 min     PT Therapeutic Procedures Time Entry  Manual Therapy Time Entry: 25  Therapeutic Exercise Time Entry: 20                   Assessment:  Pt encouraged to challenge her PF with pairing abdominal loading with PFC, as well as increasing fluid intake. Will check in in a month if issues persist.       Plan:  Continue per plan of care as tolerated. Patient to continue with HEP each day independently.      Current Problem:   1. Muscle weakness        2. MANUEL (stress urinary incontinence, female)  Follow Up In Physical Therapy            Subjective    Pt reports not great this week w pain. Doing HEP, added neck stretches. Leaking slight improvement. Cough still provokes leak.   Stretching shoulder.   IBS: normal for her, had to cancel acupuncture.   Pain: low back pain is unchanged, midline and aching.   Has been swimming.     Bladder-  Fluid consumed per day (oz.): 32 oz is inching up by a couple of ounces. Drinking herbal tea.   Day time frequency: between 2-4 hours, trying to eliminate JIC     Precautions:  Precautions  Precautions Comment: No falls in last 6 mo    Pain:  Pain Assessment  Pain Assessment: 0-10  0-10 (Numeric) Pain Score: 1  Pain Location: Back      Objective     Treatments:    Therapeutic Exercise:  Lat stretch supine w cane  LTR   STS w Kegel  Edu on Advanced MANUEL treatment for after knee improves    HEP:  Cough PFC    Manual:   Subhash lat release  Subhash QL release/ STM

## 2025-04-25 ENCOUNTER — APPOINTMENT (OUTPATIENT)
Dept: PHYSICAL THERAPY | Facility: CLINIC | Age: 70
End: 2025-04-25
Payer: MEDICARE

## 2025-04-25 DIAGNOSIS — M62.81 MUSCLE WEAKNESS: ICD-10-CM

## 2025-05-02 ENCOUNTER — TREATMENT (OUTPATIENT)
Dept: PHYSICAL THERAPY | Facility: CLINIC | Age: 70
End: 2025-05-02
Payer: MEDICARE

## 2025-05-02 DIAGNOSIS — N39.3 SUI (STRESS URINARY INCONTINENCE, FEMALE): ICD-10-CM

## 2025-05-02 DIAGNOSIS — M62.81 MUSCLE WEAKNESS: ICD-10-CM

## 2025-05-02 PROCEDURE — 97110 THERAPEUTIC EXERCISES: CPT | Mod: GP

## 2025-05-02 PROCEDURE — 97140 MANUAL THERAPY 1/> REGIONS: CPT | Mod: GP

## 2025-05-02 ASSESSMENT — PAIN SCALES - GENERAL: PAINLEVEL_OUTOF10: 1

## 2025-05-02 ASSESSMENT — PAIN - FUNCTIONAL ASSESSMENT: PAIN_FUNCTIONAL_ASSESSMENT: 0-10

## 2025-05-06 ENCOUNTER — APPOINTMENT (OUTPATIENT)
Dept: GASTROENTEROLOGY | Facility: CLINIC | Age: 70
End: 2025-05-06
Payer: MEDICARE

## 2025-05-15 DIAGNOSIS — I77.9 CAROTID ARTERY DISEASE, UNSPECIFIED LATERALITY, UNSPECIFIED TYPE: ICD-10-CM

## 2025-05-15 RX ORDER — RIVAROXABAN 20 MG/1
20 TABLET, FILM COATED ORAL DAILY
Qty: 90 TABLET | Refills: 3 | Status: SHIPPED | OUTPATIENT
Start: 2025-05-15

## 2025-05-23 ENCOUNTER — TREATMENT (OUTPATIENT)
Dept: PHYSICAL THERAPY | Facility: CLINIC | Age: 70
End: 2025-05-23
Payer: MEDICARE

## 2025-05-23 DIAGNOSIS — N39.3 SUI (STRESS URINARY INCONTINENCE, FEMALE): ICD-10-CM

## 2025-05-23 DIAGNOSIS — M62.81 MUSCLE WEAKNESS: ICD-10-CM

## 2025-05-23 PROCEDURE — 97110 THERAPEUTIC EXERCISES: CPT | Mod: GP

## 2025-05-23 ASSESSMENT — PAIN SCALES - GENERAL: PAINLEVEL_OUTOF10: 2

## 2025-05-23 ASSESSMENT — PAIN - FUNCTIONAL ASSESSMENT: PAIN_FUNCTIONAL_ASSESSMENT: 0-10

## 2025-05-23 NOTE — PROGRESS NOTES
Physical Therapy    Physical Therapy Re-Assessment       Patient Name: Sherice Dickerson  MRN: [id number]  Today's Date: 5/23/2025    Time Entry:   Time Calculation  Start Time: 0903  Stop Time: 1000  Time Calculation (min): 57 min     PT Therapeutic Procedures Time Entry  Therapeutic Exercise Time Entry: 55                 Assessment:  Pt is responding well to her updated program, and on today's reassessment shows improvements in multiple areas. She has increased hip IR, ER, flexion and abduction strength. She demos improved coordination and awareness of PF and TA recruitment. Her NIH CPSI has improved marginally, but will likely score better after her round of antibiotics, will re-test in a month.     Standardized testing and measures administered today, including ROM, strength, joint mobility testing, and observation which reveal that the patient has multiple impairments in body structure and functions, activity limitations, and participation restrictions. The patient has personal factors and comorbidities that may serve as barriers affecting plan of care. Today's findings indicate that the patient is of low complexity and would benefit from skilled PT to make measurable and meaningful changes in outcome measures and achieve improvements in the patient's functional status and individual goals. The patient verbalized understanding and is in agreement with all goals and plan of care.      Plan:  Continue per plan of care as tolerated. Patient to continue with HEP each day independently.      Current Problem:   1. Muscle weakness        2. MANUEL (stress urinary incontinence, female)  Follow Up In Physical Therapy            Subjective    Pt reports antibiotics for SIBO are making GI slightly worse but helping overall. Has been on it since Monday. Cough/sneeze still provokes leak if it is unexpected, but getting better.  Yard work causes shoulder pain.      IBS: doing Acupuncture   Pain: low back pain is unchanged, midline  and aching.   Not swimming recently.     Bladder-  Fluid consumed per day (oz.): 32 oz is inching up by a couple of ounces. Drinking herbal tea. Especially with antibiotic taking in more.   Day time frequency: between 2-4 hours, trying to eliminate JIC     Precautions:  Precautions  Precautions Comment: No falls in last 6 mo    Pain:  Pain Assessment  Pain Assessment: 0-10  0-10 (Numeric) Pain Score: 2  Pain Location: Back      Objective   Lumbar ROM (degrees):  Flexion: 100%  Extension: 75%  Sidebend R/L: 100%/75%  Rotation R/L: 100% hue  HIP AROM (degrees):  Hip Flexion R/L: > 120 hue  Hip Internal Rotation R/L:  30/40  Hip External Rotation R/L:   45 hue  Hip Abduction R/L:  > 45 hue     Flexibility (degrees):   Hamstring 90/90 position R/L: 20/30  Rectus (measured prone without spine motion) R/L:95/100 NT     Strength Testing:  Hip Flexion R/L: 4+/4+  Hip Internal Rotation R/L: 5/5  Hip External Rotation R/L:  5/5  Hip Abduction R/L: 4/4  Hip Extension R/L:  4-/4     Force Transfer-  Active Straight Leg Raise: pelvic lateral rocking hue        Outcome Measures:  NIH CPSI: 8    Treatments:    Therapeutic Exercise:  Lat stretch supine w cane  TA walkout and march with biofeedback  Bridge with Kegel x10, with band x10  LTR   STS w Kegel  Cough PFC  Standing balance with random perturbations and PFC   Right/Left/Both standing balance with spontaneous callout  Pallov press Right/Left Center with PFC and callout  Standing hip hinge with Red TB 2x10  Lateral wobble board 30s balance, 60s lateral tap downs  Throw/catch 2.5# med ball with PFC     Goals:  Patient will report pain levels of 0/10 at rest and 2/10 with activity. MET  Patient will report daytime voiding frequency 1x every 2-4 hours.    MET   Patient will report decreased episodes of leaking by 80% or more overall.  60-70% NOT MET  Patient will evacuate bowels between 2x/day to every 2 days with type 3 or 4 on Louise Stool Scale.  NT  Patient will demo normal  resting tone in pelvic floor with a score of 5 for Pelvic Muscle Strength Rating Scale.  NT  Patient will demonstrate good lumbo-pelvic dissociation to increase postural awareness and alignment for toileting and strengthening. MET   Patient will demonstrate lumbar AROM to 100% for flexion, extension, side bending and rotation to improve lumbopelvic mechanics. NOT MET  Patient will improve hip IR >/= 40 deg, ER >/= 45 deg to balance forces through hip and pelvis. PARTIALLY MET  Patient will improve hip extension and abduction strength to >/= 4+/5. PARTIALLY MET  Patient will demonstrate good TA control with Sahrmann Level 3 exercises.  MET  Patient will demonstrate the ability to contract-relax-and eccentrically lengthen the pelvic floor.  MET   Patient will be knowledgeable of healthy bowel/bladder habits measured by teach back.  MET  Patient will be knowledgeable of tools/strategies to calm the ANS measured by teach back. NT    Patient will demonstrate good diaphragm excursion/rib expansion.  MET  Patient will be independent with a home exercise program. MET  NIH-CPSI score will improve by 6 points to demo MCID.  NOT MET

## 2025-06-06 ENCOUNTER — OFFICE VISIT (OUTPATIENT)
Dept: URGENT CARE | Age: 70
End: 2025-06-06
Payer: MEDICARE

## 2025-06-06 VITALS
HEART RATE: 65 BPM | HEIGHT: 68 IN | TEMPERATURE: 98 F | SYSTOLIC BLOOD PRESSURE: 146 MMHG | RESPIRATION RATE: 19 BRPM | BODY MASS INDEX: 24.71 KG/M2 | DIASTOLIC BLOOD PRESSURE: 78 MMHG | OXYGEN SATURATION: 99 % | WEIGHT: 163 LBS

## 2025-06-06 DIAGNOSIS — R19.7 DIARRHEA, UNSPECIFIED: ICD-10-CM

## 2025-06-06 DIAGNOSIS — K52.1 ANTIBIOTIC-ASSOCIATED DIARRHEA: Primary | ICD-10-CM

## 2025-06-06 DIAGNOSIS — T36.95XA ANTIBIOTIC-ASSOCIATED DIARRHEA: Primary | ICD-10-CM

## 2025-06-06 ASSESSMENT — ENCOUNTER SYMPTOMS
NEUROLOGICAL NEGATIVE: 1
COUGH: 0
DIARRHEA: 1
SHORTNESS OF BREATH: 0
ABDOMINAL PAIN: 0
RESPIRATORY NEGATIVE: 1
VOMITING: 0
NAUSEA: 0
CONSTITUTIONAL NEGATIVE: 1
CARDIOVASCULAR NEGATIVE: 1
WHEEZING: 0

## 2025-06-06 NOTE — PROGRESS NOTES
Subjective   Patient ID: Sherice Dickerson is a 70 y.o. female. They present today with a chief complaint of Other (Finished 10 day dose Augmentin 500mg/125 mg last dose May 28. Last few days mild abdominal discomfort. No fever. Loose stools today 3x. PCP wants cdiff checked. - Entered by patient).    History of Present Illness  Subjective  Sherice Dickerson is a 70 y.o. female who presents for evaluation of diarrhea. Onset of diarrhea was several days ago. Diarrhea is occurring approximately 3 times per day. Patient describes diarrhea as semisolid. Diarrhea has been associated with recent antibiotic therapy ending several days ago.  Patient denies blood in stool, fever, illness in household contacts, recent camping, recent travel, significant abdominal pain, unintentional weight loss. Evaluation to date: none. Treatment to date: none    Objective  General: alert and oriented, in no acute distress  Hydration: well hydrated  Abdomen: soft, non-tender; bowel sounds normal; no masses, no organomegaly    Assessment/Plan  Antibiotic associated diarrhea    Appropriate educational material discussed and distributed.  Discussed the appropriate management of diarrhea.  Follow up as needed.  Stool studies per orders.        History provided by:  Patient and medical records  Other  Associated symptoms: diarrhea    Associated symptoms: no abdominal pain, no chest pain, no cough, no nausea, no shortness of breath, no vomiting and no wheezing        Past Medical History  Allergies as of 06/06/2025    (No Known Allergies)       Prescriptions Prior to Admission[1]     Medical History[2]    Surgical History[3]     reports that she has never smoked. She has never used smokeless tobacco. She reports that she does not currently use alcohol. She reports that she does not use drugs.    Review of Systems  Review of Systems   Constitutional: Negative.    HENT: Negative.     Respiratory: Negative.  Negative for cough, shortness of breath and  "wheezing.    Cardiovascular: Negative.  Negative for chest pain.   Gastrointestinal:  Positive for diarrhea. Negative for abdominal pain, nausea and vomiting.   Neurological: Negative.    All other systems reviewed and are negative.                                 Objective    Vitals:    06/06/25 1640   BP: 146/78   Pulse: 65   Resp: 19   Temp: 36.7 °C (98 °F)   TempSrc: Oral   SpO2: 99%   Weight: 73.9 kg (163 lb)   Height: 1.715 m (5' 7.5\")     No LMP recorded. Patient is postmenopausal.    Physical Exam  Vitals and nursing note reviewed.   Constitutional:       General: She is not in acute distress.     Appearance: Normal appearance. She is not ill-appearing.   HENT:      Head: Normocephalic and atraumatic.   Cardiovascular:      Rate and Rhythm: Normal rate and regular rhythm.   Pulmonary:      Effort: Pulmonary effort is normal.      Breath sounds: Normal breath sounds.   Abdominal:      General: Abdomen is flat. Bowel sounds are normal.      Palpations: Abdomen is soft. There is no hepatomegaly or splenomegaly.      Tenderness: There is no abdominal tenderness. There is no right CVA tenderness, left CVA tenderness, guarding or rebound. Negative signs include Hussein's sign, Rovsing's sign and McBurney's sign.      Hernia: No hernia is present.   Musculoskeletal:      Cervical back: Normal range of motion and neck supple.   Skin:     General: Skin is warm and dry.      Capillary Refill: Capillary refill takes less than 2 seconds.   Neurological:      Mental Status: She is alert and oriented to person, place, and time.   Psychiatric:         Behavior: Behavior normal.         Procedures    Point of Care Test & Imaging Results from this visit  No results found for this visit on 06/06/25.   Imaging  No results found.    Cardiology, Vascular, and Other Imaging  No other imaging results found for the past 2 days      Diagnostic study results (if any) were reviewed by WAGNER Rouse.    Assessment/Plan "   Allergies, medications, history, and pertinent labs/EKGs/Imaging reviewed by WAGNER Rouse.     Medical Decision Making  Risks, benefits, and alternatives of the medications and treatment plan prescribed today were discussed, and patient expressed understanding. Plan follow up as discussed or as needed if any worsening symptoms or change in condition. Reinforced red flags including (but not limited to): severe or worsening pain; difficulty swallowing; stiff neck; shortness of breath; coughing or vomiting blood; chest pain; and new or increased fever are indications to go to the Emergency Department.  At time of discharge patient was clinically well-appearing and HDS for outpatient management. The patient and/or family was educated regarding diagnosis, supportive care, OTC and Rx medications. The patient and/or family was given the opportunity to ask questions prior to discharge.  They verbalized understanding of my discussion of the plans for treatment, expected course, indications to return to  or seek further evaluation in ED, and the need for timely follow up as directed.   They were provided with a work/school excuse if requested. The after-visit summary was given to the patient and care instructions were reviewed with the patient. All questions were answered and the patient verbalized understanding of the plan of care for today.  Plan:  Recent visit notes reviewed  Meds as above  Increase clear fluids  Pcp follow up this week if not improving or worsening  ER visit anytime 24/7 for acute worsening or changing condition      Orders and Diagnoses  Diagnoses and all orders for this visit:  Antibiotic-associated diarrhea  -     Stool Pathogen Panel, PCR  -     C. difficile, PCR  Diarrhea, unspecified  -     Stool Pathogen Panel, PCR  -     C. difficile, PCR      Medical Admin Record      Patient disposition: Home    Electronically signed by WAGNER Rouse  5:35 PM           [1] (Not  in a hospital admission)   [2]   Past Medical History:  Diagnosis Date    Breast cyst 1971    Cancer (Multi) 6/12/18    Elevated blood-pressure reading, without diagnosis of hypertension 07/06/2020    Elevated blood pressure reading    Fibrocystic breast 1971    Hypertension 2018    Nontoxic single thyroid nodule 12/27/2018    Right thyroid nodule    Personal history of irradiation 2018 radioactive iodine forvthyroid cancer   [3]   Past Surgical History:  Procedure Laterality Date    BREAST BIOPSY  2010    COLONOSCOPY  09/21/2016    Complete Colonoscopy    TONSILLECTOMY  06/05/2014    Tonsillectomy    TOTAL THYROIDECTOMY  06/12/2018    Thyroid Surgery Total Thyroidectomy

## 2025-06-08 LAB
C COLI+JEJUNI+LARI FUSA STL QL NAA+PROBE: NOT DETECTED
EC STX1 GENE STL QL NAA+PROBE: NOT DETECTED
EC STX2 GENE STL QL NAA+PROBE: NOT DETECTED
NOROV GI+II ORF1-ORF2 JNC STL QL NAA+PR: NOT DETECTED
RVA NSP5 STL QL NAA+PROBE: NOT DETECTED
SALMONELLA SP RPOD STL QL NAA+PROBE: NOT DETECTED
SHIGELLA DNA SPEC QL NAA+PROBE: NOT DETECTED
V CHOL+PARA RFBL+TRKH+TNAA STL QL NAA+PR: NOT DETECTED
Y ENTERO RECN STL QL NAA+PROBE: NOT DETECTED

## 2025-06-18 DIAGNOSIS — E78.00 HYPERCHOLESTEROLEMIA: ICD-10-CM

## 2025-06-18 RX ORDER — ATORVASTATIN CALCIUM 20 MG/1
20 TABLET, FILM COATED ORAL DAILY
Qty: 90 TABLET | Refills: 0 | Status: SHIPPED | OUTPATIENT
Start: 2025-06-18

## 2025-06-20 ENCOUNTER — TREATMENT (OUTPATIENT)
Dept: PHYSICAL THERAPY | Facility: CLINIC | Age: 70
End: 2025-06-20
Payer: MEDICARE

## 2025-06-20 DIAGNOSIS — M62.81 MUSCLE WEAKNESS: ICD-10-CM

## 2025-06-20 DIAGNOSIS — N39.3 SUI (STRESS URINARY INCONTINENCE, FEMALE): ICD-10-CM

## 2025-06-20 PROCEDURE — 97110 THERAPEUTIC EXERCISES: CPT | Mod: GP

## 2025-06-20 ASSESSMENT — PAIN SCALES - GENERAL: PAINLEVEL_OUTOF10: 0 - NO PAIN

## 2025-06-20 ASSESSMENT — PAIN - FUNCTIONAL ASSESSMENT: PAIN_FUNCTIONAL_ASSESSMENT: 0-10

## 2025-06-20 NOTE — PROGRESS NOTES
Physical Therapy    Physical Therapy Discharge     Patient Name: Sherice Dickerson  MRN: [id number]  Today's Date: 6/20/2025    Time Entry:   Time Calculation  Start Time: 1101  Stop Time: 1155  Time Calculation (min): 54 min     PT Therapeutic Procedures Time Entry  Therapeutic Exercise Time Entry: 54                 Assessment:  Pt continued to respond well to her updated program. Pt has made substantial improvements with NIH CPSI since her antibiotics and updated PT program. She is independent with knowledge of normal bowel and bladder function, as well as with her HEP. Pt will continue to work on her PFC and bladder training this summer and will return in the fall if symptoms persist.     Plan:  Discharge to [facility]      Current Problem:   1. Muscle weakness        2. MANUEL (stress urinary incontinence, female)  Follow Up In Physical Therapy            Subjective    Pt reports dog walking every day. Doing yard work. Plans to go to the pool next week.     antibiotics for SIBO are making GI slightly worse.    85% improvement in MANUEL,     Shoulder doing well.   Pain: low back pain is improved last couple days.   Not swimming recently.     Bladder-  Fluid consumed per day (oz.): 32 oz is inching up by a couple of ounces with increased temp outside  Day time frequency: between 2-4 hours, trying to eliminate JIC     Precautions:  Precautions  Precautions Comment: No falls in last 6 mo    Pain:  Pain Assessment  Pain Assessment: 0-10  0-10 (Numeric) Pain Score: 0 - No pain      Objective   Gait: hue outtoeing, trunk shift from scoliosis, increased thoracic kyphosis, reduced rotational movement     Outcome Measures:  NIH CPSI: 2    Treatments:    Therapeutic Exercise:  Lat stretch supine w cane NV  TA walkout and march with biofeedback  Bridge with Kegel x10, with band x10  LTR   STS w Kegel  Cough PFC  Standing balance with random perturbations and PFC   Right/Left/Both standing balance with spontaneous callout  Pallov press  Right/Left Center with PFC and callout  Standing hip hinge with Red TB 2x10  Lateral wobble board 30s balance, 60s lateral tap downs  Throw/catch 2.5# med ball with PFC

## 2025-06-23 NOTE — PROGRESS NOTES
REASON FOR VISIT:  IBS, Abdominal pain, diarrhea, constipation    HPI:  Sherice Dickerson is a 70 y.o. female who presents for follow up  for irritable bowel syndrome. Diagnosed with SIBO recently at [facility].  She has followed with [facility] for few years and receives acupuncture periodically for IBS.  Treated recently for SIBO with Augmentin 500mg/125mg mg twice a day for 10 days (5/25) and then developed diarrhea and abdominal discomfort. Went to Urgent Care on 6/6/25 Recent blood tests were normal. C Diff stool test pending.  Advised by her functional medicine physician that if her diarrhea continues to follow-up in the GI clinic and consider having other colonoscopy to rule out Crohn's disease.  She had a colonoscopy in August 2024 that revealed adenomatous polyps and was advised repeat colonoscopy in 5 years for surveillance for polyps.  Thyroid levels were abnormal.  She follows with a endocrinologist at [facility]. Last seen in the GI clinic at  in 12/23.     Moves bowels -  once a day - has some constipation or loose stools   Denies hematochezia, melena, urgency, diarrhea or nocturnal bowel movements  RLQ Abdominal pain, intermittent, pain is a 1-2 /10   Denies nausea, vomiting, reflux, dysphagia or bloating   Her weight and appetite are stable  Denies eye pain, visual changes, joint pain or swelling, skin rashes, lesions or painful nodules or oral sores    PMH- IBS, SIBO, HLD, HTN,A-fib, carotid artery disease, mitral regurgitation hypothyroidism   PSH- Breast Biopsy; denies history of abdominal surgery  Fam Hx-denies a family history of colon cancer, other GI malignancies, IBD or celiac disease  Soc Hx -does not use tobacco products or smokeless tobacco products, social alcohol use, denies illicit drug use    Med list notable for - Xarelto , Synthroid    Labs   Lab Results   Component Value Date    WBC 3.5 (L) 01/13/2025    HGB 13.7 01/13/2025    HCT 41.1 01/13/2025    MCV 92 01/13/2025    PLT  266 01/13/2025     Lab Results   Component Value Date     01/13/2025    K 4.5 01/13/2025     01/13/2025    CO2 29 01/13/2025    BUN 16 01/13/2025    CREATININE 0.94 01/13/2025    GLUCOSE 87 01/13/2025    CALCIUM 9.6 01/13/2025      Lab Results   Component Value Date    ALKPHOS 59 01/13/2025    ALKPHOS 52 11/17/2023    ALKPHOS 58 11/23/2022    BILITOT 0.7 01/13/2025    BILITOT 0.8 11/17/2023    BILITOT 0.9 11/23/2022    BILIDIR 0.2 01/08/2018    PROT 7.6 01/13/2025    PROT 7.6 11/17/2023    PROT 8.0 11/23/2022    ALT 21 01/13/2025    ALT 19 11/17/2023    ALT 19 11/23/2022    AST 20 01/13/2025    AST 19 11/17/2023    AST 20 11/23/2022      Lab Results   Component Value Date    INR 1.0 09/09/2021      Lab Results   Component Value Date    IRON 134 05/31/2024    TIBC 418 05/31/2024      Lab Results   Component Value Date    FERRITIN 55 05/31/2024      Lab Results   Component Value Date    TSH 7.57 (H) 01/13/2025    FREET4 1.69 (H) 01/13/2025          No fhx of CRC.       Previous endoscopic evaluation :     >> Colonoscopy -3/4/2024-Dr. Gandara    Scattered diverticulosis of mild severity in the sigmoid colon  Hemorrhoids  3 - subcentimeter polyps removed with cold snare    Repeat colonoscopy in 5 years-3/4/2029    Biopsy Results -     Adenomatous polyps x 2 in the ascending colon  Fragments of tubular adenoma in the hepatic flexure        REVIEW OF SYSTEMS    Review of Systems   Constitutional:  Negative for appetite change and unexpected weight change.   HENT:  Negative for mouth sores.    Eyes: Negative.  Negative for pain and visual disturbance.   Respiratory:  Negative for cough and shortness of breath.    Cardiovascular:  Negative for chest pain.   Gastrointestinal:  Positive for abdominal pain, constipation and diarrhea.   Endocrine: Negative.    Genitourinary:  Negative for difficulty urinating.   Musculoskeletal:  Negative for arthralgias, joint swelling and myalgias.   Skin:  Negative for rash.    Allergic/Immunologic: Negative for environmental allergies and food allergies.   Psychiatric/Behavioral:  The patient is not nervous/anxious.           Allergies[1]    Medical History[2]    Surgical History[3]    Family History[4]    Social History     Tobacco Use    Smoking status: Never    Smokeless tobacco: Never   Substance Use Topics    Alcohol use: Not Currently     Comment: socially       Current Medications[5]    PHYSICAL EXAM:  There were no vitals taken for this visit.     Physical Exam  Constitutional:       Appearance: Normal appearance.   HENT:      Head: Normocephalic and atraumatic.      Nose: Nose normal.   Eyes:      Pupils: Pupils are equal, round, and reactive to light.   Cardiovascular:      Rate and Rhythm: Normal rate and regular rhythm.   Pulmonary:      Effort: Pulmonary effort is normal.      Breath sounds: Normal breath sounds.   Abdominal:      General: Abdomen is flat. Bowel sounds are normal. There is no distension.      Palpations: Abdomen is soft. There is no mass.      Tenderness: There is no abdominal tenderness. There is no guarding or rebound.      Hernia: No hernia is present.   Musculoskeletal:         General: Normal range of motion.      Cervical back: Normal range of motion and neck supple.   Skin:     General: Skin is warm and dry.   Neurological:      Mental Status: She is alert and oriented to person, place, and time.   Psychiatric:         Mood and Affect: Mood normal.         Behavior: Behavior normal.          ASSESSMENT  70-year-old female with a history of atrial fibrillation, hypertension, carotid artery disease, mitral regurgitation, hypercholesterolemia and hypothyroidism, here for a follow-up for diarrhea, constipation and abdominal pain.  She has a history of IBS and has followed with [facility] for few years.  She was treated recently for SIBO with Augmentin and developed diarrhea and abdominal pain for several days.  She went  to the urgent care on 6/6/2025.  She was asked to check C. difficile stool test.  Her thyroid blood tests were abnormal and she was advised to follow up with her endocrinologist.  Her last colonoscopy was in March 2024 and it revealed diverticulosis in the sigmoid colon, hemorrhoids and adenomatous polyps.  Advised to repeat colonoscopy in 5 years for surveillance.  She was advised by [facility] functional medicine doctor to follow-up in the GI clinic if her diarrhea continued and consider having another colonoscopy to rule out  IBD.     She currently feels okay.  She usually moves her bowels once a day in the morning. She has alternating loose stool or constipation.  She denies urgency, nocturnal bowel movements , frequency, hematochezia or melena.  Her weight has been stable.  She has rare right lower quadrant abdominal pain that is 1-2 on a scale of 1-10.    Advised patient that I do not think she needs another colonoscopy at this time.  Advised her to call the office if her symptoms of diarrhea return and I will order stool tests and blood tests to rule out inflammation or infection.  Advised patient that her recent thyroid blood test were abnormal and she may need an adjustment on her medication.  Advised that her thyroid function can affect her weight, mood and bowel pattern.      PLAN:    Refer to nutritionist -to discuss low FODMAP diet  Follow-up in the GI clinic as needed  Follow-up with your endocrinologist regarding your abnormal thyroid blood tests        CHI Gamez-ISAÍAS      Date: 6/24/2025   Time: 12:59PM          [1] No Known Allergies  [2]   Past Medical History:  Diagnosis Date    Breast cyst 1971    Cancer (Multi) 6/12/18    Elevated blood-pressure reading, without diagnosis of hypertension 07/06/2020    Elevated blood pressure reading    Fibrocystic breast 1971    Hypertension 2018    Nontoxic single thyroid nodule 12/27/2018    Right thyroid nodule    Personal history of  irradiation 2018 radioactive iodine forvthyroid cancer   [3]   Past Surgical History:  Procedure Laterality Date    BREAST BIOPSY  2010    COLONOSCOPY  09/21/2016    Complete Colonoscopy    TONSILLECTOMY  06/05/2014    Tonsillectomy    TOTAL THYROIDECTOMY  06/12/2018    Thyroid Surgery Total Thyroidectomy   [4]   Family History  Problem Relation Name Age of Onset    Hypertension Mother Leta Keita     Heart disease Mother Leta Keita     Breast cancer Mother's Sister Cortney     Cancer Mother's Sister Cortney Wolanin    [5]   Current Outpatient Medications   Medication Sig Dispense Refill    atorvastatin (Lipitor) 20 mg tablet TAKE 1 TABLET DAILY 90 tablet 0    cholecalciferol (Vitamin D-3) 25 MCG (1000 UT) tablet Take 1 tablet (1,000 Units) by mouth once daily.      estradiol (Estrace) 0.01 % (0.1 mg/gram) vaginal cream Insert a pea-sized amount into vagina at bedtime three times per week 42.5 g 3    ferrous sulfate 325 (65 Fe) MG EC tablet Do not crush, chew, or split. 30 tablet 3    levothyroxine (Synthroid, Levoxyl) 88 mcg tablet Take 1 tablet (88 mcg) by mouth once daily in the morning. Take before meals.      lisinopril 5 mg tablet TAKE 1 TABLET DAILY 90 tablet 3    Xarelto 20 mg tablet TAKE 1 TABLET DAILY 90 tablet 3     No current facility-administered medications for this visit.

## 2025-06-24 ENCOUNTER — APPOINTMENT (OUTPATIENT)
Dept: GASTROENTEROLOGY | Facility: CLINIC | Age: 70
End: 2025-06-24
Payer: MEDICARE

## 2025-06-24 VITALS
HEART RATE: 60 BPM | OXYGEN SATURATION: 100 % | HEIGHT: 68 IN | TEMPERATURE: 96.6 F | WEIGHT: 160 LBS | DIASTOLIC BLOOD PRESSURE: 86 MMHG | BODY MASS INDEX: 24.25 KG/M2 | SYSTOLIC BLOOD PRESSURE: 144 MMHG

## 2025-06-24 DIAGNOSIS — K63.8219 SMALL INTESTINAL BACTERIAL OVERGROWTH (SIBO): ICD-10-CM

## 2025-06-24 DIAGNOSIS — K58.2 IRRITABLE BOWEL SYNDROME WITH BOTH CONSTIPATION AND DIARRHEA: Primary | ICD-10-CM

## 2025-06-24 PROCEDURE — 3008F BODY MASS INDEX DOCD: CPT | Performed by: REGISTERED NURSE

## 2025-06-24 PROCEDURE — 1036F TOBACCO NON-USER: CPT | Performed by: REGISTERED NURSE

## 2025-06-24 PROCEDURE — 3079F DIAST BP 80-89 MM HG: CPT | Performed by: REGISTERED NURSE

## 2025-06-24 PROCEDURE — 1159F MED LIST DOCD IN RCRD: CPT | Performed by: REGISTERED NURSE

## 2025-06-24 PROCEDURE — 99214 OFFICE O/P EST MOD 30 MIN: CPT | Performed by: REGISTERED NURSE

## 2025-06-24 PROCEDURE — 3077F SYST BP >= 140 MM HG: CPT | Performed by: REGISTERED NURSE

## 2025-06-24 RX ORDER — BISMUTH SUBSALICYLATE 262 MG
1 TABLET,CHEWABLE ORAL DAILY
COMMUNITY

## 2025-06-24 RX ORDER — MELATONIN 1 MG
TABLET,CHEWABLE ORAL
COMMUNITY

## 2025-06-24 ASSESSMENT — ENCOUNTER SYMPTOMS
ARTHRALGIAS: 0
ENDOCRINE NEGATIVE: 1
NERVOUS/ANXIOUS: 0
CONSTIPATION: 1
EYE PAIN: 0
UNEXPECTED WEIGHT CHANGE: 0
JOINT SWELLING: 0
MYALGIAS: 0
EYES NEGATIVE: 1
COUGH: 0
SHORTNESS OF BREATH: 0
DIARRHEA: 1
ABDOMINAL PAIN: 1
DIFFICULTY URINATING: 0
APPETITE CHANGE: 0

## 2025-06-25 ENCOUNTER — PATIENT OUTREACH (OUTPATIENT)
Dept: CARE COORDINATION | Facility: CLINIC | Age: 70
End: 2025-06-25
Payer: MEDICARE

## 2025-06-25 NOTE — PROGRESS NOTES
Referral received for NUTR Education from [employer].  Attempted to reach pt at listed number. Voice message was left with contact information for return call.     Thank you,  Kyra Rodriguez M.Ed, SHARONAN, LD, [facility]  Registered Dietitian, Certified Diabetes Care and    [phone]

## 2025-07-01 ENCOUNTER — PATIENT OUTREACH (OUTPATIENT)
Dept: CARE COORDINATION | Facility: CLINIC | Age: 70
End: 2025-07-01
Payer: MEDICARE

## 2025-07-01 NOTE — PROGRESS NOTES
Referral received for NUTR Education from [employer].  Attempted to reach pt at listed number for the second time. Voice message was left with contact information for return call.     Thank you,  Kyra Rodriguez M.Ed, SHARONAN, LD, [facility]  Registered Dietitian, Certified Diabetes Care and    [phone]

## 2025-07-08 ENCOUNTER — PATIENT OUTREACH (OUTPATIENT)
Dept: CARE COORDINATION | Facility: CLINIC | Age: 70
End: 2025-07-08
Payer: MEDICARE

## 2025-07-08 NOTE — PROGRESS NOTES
Referral received for NUTR Education from [employer].  Attempted to reach pt at listed number on two separate occasions with no return call. [employer] message sent and program closed.     Thank you,  Kyra Rodriguez M.Ed, SHARONAN, LD, [facility]  Registered Dietitian, Certified Diabetes Care and    [phone]

## 2025-07-09 ENCOUNTER — APPOINTMENT (OUTPATIENT)
Dept: PRIMARY CARE | Facility: CLINIC | Age: 70
End: 2025-07-09
Payer: MEDICARE

## 2025-07-15 ENCOUNTER — OFFICE VISIT (OUTPATIENT)
Dept: CARDIOLOGY | Facility: CLINIC | Age: 70
End: 2025-07-15
Payer: MEDICARE

## 2025-07-15 VITALS
HEART RATE: 58 BPM | WEIGHT: 161.56 LBS | DIASTOLIC BLOOD PRESSURE: 73 MMHG | HEIGHT: 68 IN | OXYGEN SATURATION: 99 % | BODY MASS INDEX: 24.48 KG/M2 | SYSTOLIC BLOOD PRESSURE: 126 MMHG

## 2025-07-15 DIAGNOSIS — I48.91 ATRIAL FIBRILLATION, NEW ONSET (MULTI): Primary | ICD-10-CM

## 2025-07-15 PROCEDURE — 99214 OFFICE O/P EST MOD 30 MIN: CPT | Performed by: INTERNAL MEDICINE

## 2025-07-15 PROCEDURE — 1126F AMNT PAIN NOTED NONE PRSNT: CPT | Performed by: INTERNAL MEDICINE

## 2025-07-15 PROCEDURE — 1036F TOBACCO NON-USER: CPT | Performed by: INTERNAL MEDICINE

## 2025-07-15 PROCEDURE — 3078F DIAST BP <80 MM HG: CPT | Performed by: INTERNAL MEDICINE

## 2025-07-15 PROCEDURE — 99212 OFFICE O/P EST SF 10 MIN: CPT | Mod: 25

## 2025-07-15 PROCEDURE — 3074F SYST BP LT 130 MM HG: CPT | Performed by: INTERNAL MEDICINE

## 2025-07-15 PROCEDURE — 1159F MED LIST DOCD IN RCRD: CPT | Performed by: INTERNAL MEDICINE

## 2025-07-15 PROCEDURE — 93010 ELECTROCARDIOGRAM REPORT: CPT | Performed by: INTERNAL MEDICINE

## 2025-07-15 PROCEDURE — 3008F BODY MASS INDEX DOCD: CPT | Performed by: INTERNAL MEDICINE

## 2025-07-15 PROCEDURE — 93005 ELECTROCARDIOGRAM TRACING: CPT | Performed by: INTERNAL MEDICINE

## 2025-07-15 RX ORDER — METOPROLOL SUCCINATE 25 MG/1
25 TABLET, EXTENDED RELEASE ORAL DAILY
Qty: 90 TABLET | Refills: 3 | Status: SHIPPED | OUTPATIENT
Start: 2025-07-15 | End: 2026-07-15

## 2025-07-15 ASSESSMENT — ENCOUNTER SYMPTOMS
LOSS OF SENSATION IN FEET: 0
OCCASIONAL FEELINGS OF UNSTEADINESS: 0
DEPRESSION: 0

## 2025-07-15 ASSESSMENT — PAIN SCALES - GENERAL: PAINLEVEL_OUTOF10: 0-NO PAIN

## 2025-07-15 ASSESSMENT — COLUMBIA-SUICIDE SEVERITY RATING SCALE - C-SSRS
6. HAVE YOU EVER DONE ANYTHING, STARTED TO DO ANYTHING, OR PREPARED TO DO ANYTHING TO END YOUR LIFE?: NO
1. IN THE PAST MONTH, HAVE YOU WISHED YOU WERE DEAD OR WISHED YOU COULD GO TO SLEEP AND NOT WAKE UP?: NO
2. HAVE YOU ACTUALLY HAD ANY THOUGHTS OF KILLING YOURSELF?: NO

## 2025-07-15 NOTE — PROGRESS NOTES
Primary Care Physician: Shannon Ojeda MD  Date of Visit: 07/15/2025 10:40 AM EDT  [place] [facility] [place] [address]     Chief Complaint:   Chief Complaint   Patient presents with    Follow-up    Atrial Fibrillation   Chief complaint paroxysmal atrial fibrillation.  Recent labs and clinical course reviewed   More palpiations on higher thyroid supplementation, left neck swelling  HPI / Summary:   Sherice Dickerson is a 70 y.o. female presents for new cardiovascular evaluation. No ref. provider found   History of PAF, possible HCM with basal septal hypertrophy based on previous echo.  Tsh high with more palpitations      Thyroidectomy.  February 2021 echo no structural cardiac abnormality was noted no significant valvular pathology normal chamber size she is on anticoagulation no clinical bleeding  Specialty Problems          Cardiovascular    Familial combined hyperlipidemia    Atrial fibrillation, new onset (Multi)    Benign essential hypertension    Carotid artery disease    Enlarged heart    Heart murmur    Hypercholesterolemia    Mitral regurgitation    Palpitations    Respiratory tract infection due to COVID-19 virus        Medical History[1]       Surgical History[2]       Social History:   reports that she has never smoked. She has never used smokeless tobacco. She reports that she does not currently use alcohol. She reports that she does not use drugs.      Allergies:  RX Allergies[3]    Outpatient Medications:  Current Outpatient Medications   Medication Instructions    atorvastatin (LIPITOR) 20 mg, oral, Daily    cholecalciferol (VITAMIN D-3) 1,000 Units, Daily    estradiol (Estrace) 0.01 % (0.1 mg/gram) vaginal cream Insert a pea-sized amount into vagina at bedtime three times per week    ferrous sulfate 325 (65 Fe) MG EC tablet Do not crush, chew, or split.    levothyroxine (SYNTHROID, LEVOXYL) 88 mcg, Daily before breakfast    lisinopril 5 mg, oral, Daily    MAGNESIUM GLYCINATE ORAL Take by mouth.     "melatonin 1 mg tablet,chewable Chew.    multivitamin tablet 1 tablet, Daily    Xarelto 20 mg, oral, Daily       ROS     Physical Exam:  Vitals:    07/15/25 1047   BP: 126/73   BP Location: Left arm   Patient Position: Sitting   BP Cuff Size: Large adult   Pulse: 58   SpO2: 99%   Weight: 73.3 kg (161 lb 9 oz)   Height: 1.727 m (5' 8\")     Wt Readings from Last 5 Encounters:   07/15/25 73.3 kg (161 lb 9 oz)   06/24/25 72.6 kg (160 lb)   06/06/25 73.9 kg (163 lb)   02/03/25 79.8 kg (176 lb)   01/17/25 77.9 kg (171 lb 11.8 oz)     Body mass index is 24.57 kg/m².   Physical Exam   Well-appearing female in no acute distress.  Skin is warm and dry.  Neck veins are flat.  Normal carotid upstrokes without bruits.  Regular rhythm without gallop or murmur.  Clear lungs without crackles.  Soft abdomen without masses.  No dependent edema intact pedal pulses  Last Labs:  CMP:  Recent Labs     01/13/25  1133 11/17/23  0938 11/23/22  1034 09/09/21  1328 10/01/20  1004    138 136 137 136   K 4.5 3.7 4.0 3.7 3.7    101 99 100 99   CO2 29 30 30 28 29   ANIONGAP 13 11 11 13 12   BUN 16 13 13 15 13   CREATININE 0.94 0.92 0.84 0.81 0.82   EGFR 66 68  --   --   --    GLUCOSE 87 89 90 65* 89     Recent Labs     01/13/25  1133 11/17/23  0938 11/23/22  1034 09/09/21  1328 10/01/20  1004   ALBUMIN 4.4 4.3 4.5 4.4 4.2   ALKPHOS 59 52 58 59 55   ALT 21 19 19 19 22   AST 20 19 20 21 24   BILITOT 0.7 0.8 0.9 0.9 0.8     CBC:  Recent Labs     01/13/25  1133 11/17/23  0938 11/23/22  1034 09/09/21  1328 10/01/20  1004   WBC 3.5* 4.2* 4.5 3.5* 4.1*   HGB 13.7 13.2 14.0 13.5 13.3   HCT 41.1 39.8 42.2 39.4 41.1    255 299 234 239   MCV 92 94 93 94 94     COAG:   Recent Labs     09/09/21  1328   INR 1.0     HEME/ENDO:  Recent Labs     01/13/25  1133 05/31/24  1202 11/23/22  1034 03/26/21  0824 02/05/21  1500 10/10/19  0903   FERRITIN  --  55  --   --   --   --    IRONSAT  --  32  --   --   --   --    TSH 7.57*  --  2.19  --  2.21 1.32 " "  HGBA1C  --   --   --  5.5  --   --       CARDIAC: No results for input(s): \"LDH\", \"CKMB\", \"TROPHS\", \"BNP\" in the last 29317 hours.    No lab exists for component: \"CK\", \"CKMBP\"  Recent Labs     01/13/25  1133 11/17/23  0938 11/23/22  1034 10/01/20  1004 10/10/19  0903   CHOL 264* 218* 219* 186 196   LDLF  --   --  114* 94 109*   HDL 87.7 81.1 94.0 81.0 75.0   TRIG 81 82 56 53 60       Last Cardiology Tests:  ECG:      Echo:  Echo Results:  No results found for this or any previous visit from the past 3650 days.       Cath:      Stress Test:  Stress Results:  No results found for this or any previous visit from the past 365 days.         Cardiac Imaging:  BI mammo bilateral screening tomosynthesis  Narrative: Interpreted By:  Ludivina Powell,  and Mariusz Hernandez   STUDY:  BI MAMMO BILATERAL SCREENING TOMOSYNTHESIS;  1/17/2025 8:46 am      ACCESSION NUMBER(S):  [id number]      ORDERING CLINICIAN:  RADHA REY      INDICATION:  Screening. History of bilateral benign excisional biopsies.      ,Z12.31 Encounter for screening mammogram for malignant neoplasm of  breast      COMPARISON:  01/05/2024, 01/04/2023, 01/05/2022      FINDINGS:  2D and tomosynthesis images were reviewed at 1 mm slice thickness.      Density:  The breasts are heterogeneously dense, which may obscure  small masses.      No suspicious masses or calcifications are identified.      Impression: No mammographic evidence of malignancy.      BI-RADS CATEGORY:  BI-RADS Category:  1 Negative.  Recommendation:  Annual Screening.  Recommended Date:  1 Year.  Laterality:  Bilateral.              For any future breast imaging appointments, please call [phone] ([phone]).      I personally reviewed the images/study and I agree with the resident  findings as stated by Mary Vee MD. This study was interpreted at  [place], [place].      MACRO:  None      Signed by: Ludivina Powell 1/21/2025 10:51 AM  Dictation " workstation:   [id number]        Assessment/Plan     Currently on thyroid supplementation I believe being given in an attempt to suppress a thyroid nodule but she is clinically a bit hyperthyroid with some GI distress and increased palpitations.  She is having episodes that could be PAT versus short bursts of A-fib.  I suggested the addition of metoprolol succinate 12.5 mg for 1 week then uptitrated to 25.  Hopefully with de-escalation of her thyroid doses her symptoms will improve.  She will have a virtual visit to see us in 4 weeks to discuss her response to therapy.  We discussed monitoring but she would prefer not to have another long-term monitor at this juncture.  Thank you for allowing me to participate in her care      Orders:  No orders of the defined types were placed in this encounter.     Followup Appts:  Future Appointments   Date Time Provider Department Center   7/18/2025  9:00 AM Flora Sutherland PT [id number] [place]   7/29/2025  2:00 PM Sydni Carbajal RD, LD [id number] [place]   10/13/2025 10:30 AM Joshua Silevr MD [id number] [facility]   1/2/2026  9:50 AM Princess Ray MD MPH [id number] [place]   1/7/2026 10:00 AM Shannon Ojeda MD [id number] [place]           ____________________________________________________________  Partha Murry MD    Senior Attending Physician  [facility]  [facility]         [1]   Past Medical History:  Diagnosis Date    Breast cyst 1971    Cancer (Multi) 6/12/18    Elevated blood-pressure reading, without diagnosis of hypertension 07/06/2020    Elevated blood pressure reading    Fibrocystic breast 1971    Hypertension 2018    Nontoxic single thyroid nodule 12/27/2018    Right thyroid nodule    Personal history of irradiation 2018 radioactive iodine forvthyroid cancer   [2]   Past Surgical History:  Procedure Laterality Date    BREAST BIOPSY  2010    COLONOSCOPY  09/21/2016    Complete Colonoscopy    TONSILLECTOMY   06/05/2014    Tonsillectomy    TOTAL THYROIDECTOMY  06/12/2018    Thyroid Surgery Total Thyroidectomy   [3] No Known Allergies

## 2025-07-16 LAB
ATRIAL RATE: 54 BPM
P AXIS: 35 DEGREES
P OFFSET: 191 MS
P ONSET: 143 MS
PR INTERVAL: 148 MS
Q ONSET: 217 MS
QRS COUNT: 9 BEATS
QRS DURATION: 112 MS
QT INTERVAL: 462 MS
QTC CALCULATION(BAZETT): 438 MS
QTC FREDERICIA: 445 MS
R AXIS: 88 DEGREES
T AXIS: 74 DEGREES
T OFFSET: 448 MS
VENTRICULAR RATE: 54 BPM

## 2025-07-18 ENCOUNTER — APPOINTMENT (OUTPATIENT)
Dept: PHYSICAL THERAPY | Facility: CLINIC | Age: 70
End: 2025-07-18
Payer: MEDICARE

## 2025-07-25 DIAGNOSIS — E78.00 HYPERCHOLESTEROLEMIA: ICD-10-CM

## 2025-07-25 RX ORDER — ATORVASTATIN CALCIUM 20 MG/1
20 TABLET, FILM COATED ORAL DAILY
Qty: 90 TABLET | Refills: 0 | Status: SHIPPED | OUTPATIENT
Start: 2025-07-25

## 2025-07-29 ENCOUNTER — NUTRITION (OUTPATIENT)
Dept: NUTRITION | Facility: CLINIC | Age: 70
End: 2025-07-29
Payer: MEDICARE

## 2025-07-29 DIAGNOSIS — K58.2 IRRITABLE BOWEL SYNDROME WITH BOTH CONSTIPATION AND DIARRHEA: Primary | ICD-10-CM

## 2025-07-29 PROCEDURE — 97802 MEDICAL NUTRITION INDIV IN: CPT

## 2025-07-29 NOTE — PROGRESS NOTES
Nutrition Assessment     Reason for Visit:  Sherice Dickerson is a 70 y.o. female who presents for nutrition counseling with IBS.      Anthropometrics:  Wt Readings from Last 10 Encounters:   07/15/25 73.3 kg (161 lb 9 oz)   06/24/25 72.6 kg (160 lb)   06/06/25 73.9 kg (163 lb)   02/03/25 79.8 kg (176 lb)   01/17/25 77.9 kg (171 lb 11.8 oz)   01/06/25 77.9 kg (171 lb 12.8 oz)   09/09/24 76.7 kg (169 lb)   06/24/24 75.7 kg (166 lb 14.2 oz)   06/24/24 76.1 kg (167 lb 12.8 oz)   05/31/24 75.8 kg (167 lb 3.2 oz)         Food And Nutrient Intake:  Food and Nutrient History  Food and Nutrient History: Pt presents for nutritioncounseling in the context of IBS and gluten intolerance.  Pt states that she has had GI issues for most of her life.  She has found that most carbohydrates aggravate her symtoms so has become GF.  She observes that her bloating is improved.  However, pt continues to have intermitten constipation with bouts of diarrhea.  She is curious about the low- FODMAP diet but hesitant to start due to the extreme restriction.  Her weight is down 10# over the past 6 mos.  Pt attributes her weight loss to summer heat and decreased appetite.  Her BMI is wnl's.  Provided pt with strategies for IBS and gave her a brief overview of the low-FODMAP diet to decide if she would like to proceed.  Food Allergy: Gluten/wheat     Food Intake  Meal 1: breakfast: oatmeal; protein powder with oatmilk and banana  Meal 2: lunch: 1/2 time: leftovers or Wasa crackers with peanut butter  Snacks : snacks; Grain free cookies;  Meal 3: dinner: salmon and sweet potato, ground turkey with cauliflower, tomatoes, jicama with Tumeric  Snacks: Rice cakes and cookies  Fluid Intake: Peppermint tea, water, cut back on coffee  Pattern of Alcohol Consumption: rarely    Food Preparation  Cooking: Patient (does not like to cook)     Bioactive Substance Intake  Pattern of Alcohol Consumption: rarely       Micronutrient Intake  Vitamin Intake:  D  Mineral/Element Intake: Magnesium, Iron    Food Supplement Intake  Vitamin Intake: D  Mineral/Element Intake: Magnesium, Iron    Medication and Complementary/Alternative Medicine Use  Prescription Medication Use: pancreatic enzymes  OTC Medication Use: Probiotic; Elvia's tummy fiber    Physical Activities:  Physical Activity  Frequency (times/week): 6 (times/week)  Duration (minutes/day): 30 minutes/day  Type of Physical Activity: dog walking- 2x/day; water walking; garden       Nutrition Focused Physical Exam:  Subcutaneous Fat Loss  Defer Subcutaneous Fat Loss Assessment: Defer all  Defer All Reason: no malnutrition suspected  Muscle Wasting  Defer Muscle Wasting Assessment: Defer all  Physical Findings  Digestive System Findings: Constipation, Abdominal bloating, Increased gas, Diarrhea    Energy Needs  Estimated Energy Needs  Total Energy Estimated Needs in 24 hours (kCal): 1832 kCal  Method for Estimating Needs: 25 kcals/kg  Estimated Protein Needs  Total Protein Estimated Needs in 24 Hours (g): 58 g  Method for Estimating 24 Hour Protein Needs: .8g/kg    Nutrition Diagnosis   Malnutrition Diagnosis  Patient has Malnutrition Diagnosis: No  Nutrition Diagnosis  Patient has Nutrition Diagnosis: Yes  Diagnosis Status (1): New  Nutrition Diagnosis 1: Altered GI function  Related to (1): etiology unknown  As Evidenced by (1): pt interview; diet recall    Nutrition Interventions/Recommendations   Food and Nutrition Delivery  Meals & Snacks: Modification of diet for specific food and/or ingredient, Fiber-modified diet, Modification of schedule of oral intake  Goals: small, frequent meals, gluten free, high soluble fiber  Nutrition Education  Nutrition Education Content: Content related nutrition education  Goals: Instructed pt on Low FODMAP diet, high FODMAP foods, LOW FODMAP foods and label reading. Discussed elimination portion of diet to remove high FODMAP foods in order help heal the gut while the trial  period allows to pinpoint the exact foods that are causing symptoms. Instructed that elimination period should be 4-6 weeks to allow healing and resolve symptoms. Discussed importance of balanced meals during elimination period. Plan to meet again prior to reintroduction of high FODMAP foods. Both verbal and written education provided in the one-on-one setting. Pt verbalized understanding of information provided. All questions answered to pt satisfaction throughout visit  Nutrition Counseling  Theoretical Basis/Approach: Nutrition counseling based on health belief model  Nutrition Counseling Strategies : Nutrition counseling based on problem solving strategy    Patient Instructions    Try small, frequent meals or snacks 5-6x/day.  Larger meals can aggravate symptoms.     Increase soluble fiber such as oats, bananas, cooked vegetables; ground flaxseed or katlin  seed.   Aim for 25-30g of fiber in general- see fiber handout.   Increase fluid intake to 64 oz per day.     Avoid fried or greasy foods, processed meats, carbonated beverages, excessive caffeine or spicy foods.     Continue to take a probiotic or include daily yogurt in your diet.     Increase activity which can improve digestion.      Nutrition Monitoring and Evaluation   Food and Nutrient Intake  Monitoring and Evaluation Plan: Fiber intake, Meal/snack pattern  Meal/Snack Pattern: Estimated meal and snack pattern  Criteria: smallfrequnet meals, slowed eating  Estimated fiber intake: Estimated fiber intake  Criteria: 25-30g of fiber daily, 10g of soluble fiber      Readiness to Change : Good  Level of Understanding : Good  Anticipated Compliant : Good

## 2025-07-29 NOTE — PATIENT INSTRUCTIONS
Try small, frequent meals or snacks 5-6x/day.  Larger meals can aggravate symptoms.     Increase soluble fiber such as oats, bananas, cooked vegetables; ground flaxseed or katlin  seed.   Aim for 25-30g of fiber in general- see fiber handout.   Increase fluid intake to 64 oz per day.     Avoid fried or greasy foods, processed meats, carbonated beverages, excessive caffeine or spicy foods.     Continue to take a probiotic or include daily yogurt in your diet.     Increase activity which can improve digestion.

## 2025-08-22 ENCOUNTER — TELEMEDICINE (OUTPATIENT)
Dept: CARDIOLOGY | Facility: CLINIC | Age: 70
End: 2025-08-22
Payer: MEDICARE

## 2025-08-22 DIAGNOSIS — Z01.810 PRE-OPERATIVE CARDIOVASCULAR EXAMINATION: ICD-10-CM

## 2025-08-22 DIAGNOSIS — I48.91 ATRIAL FIBRILLATION, NEW ONSET (MULTI): Primary | ICD-10-CM

## 2025-09-08 ENCOUNTER — APPOINTMENT (OUTPATIENT)
Dept: PRIMARY CARE | Facility: CLINIC | Age: 70
End: 2025-09-08
Payer: MEDICARE

## 2025-10-13 ENCOUNTER — APPOINTMENT (OUTPATIENT)
Dept: SLEEP MEDICINE | Facility: CLINIC | Age: 70
End: 2025-10-13
Payer: MEDICARE

## 2026-01-02 ENCOUNTER — APPOINTMENT (OUTPATIENT)
Dept: UROLOGY | Facility: CLINIC | Age: 71
End: 2026-01-02
Payer: MEDICARE

## 2026-01-07 ENCOUNTER — APPOINTMENT (OUTPATIENT)
Dept: PRIMARY CARE | Facility: CLINIC | Age: 71
End: 2026-01-07
Payer: MEDICARE

## 2026-03-23 ENCOUNTER — APPOINTMENT (OUTPATIENT)
Dept: SLEEP MEDICINE | Facility: CLINIC | Age: 71
End: 2026-03-23
Payer: MEDICARE